# Patient Record
Sex: FEMALE | Race: WHITE | NOT HISPANIC OR LATINO | Employment: UNEMPLOYED | URBAN - METROPOLITAN AREA
[De-identification: names, ages, dates, MRNs, and addresses within clinical notes are randomized per-mention and may not be internally consistent; named-entity substitution may affect disease eponyms.]

---

## 2023-09-12 PROBLEM — K76.0 FATTY LIVER: Status: ACTIVE | Noted: 2023-09-12

## 2023-09-12 PROBLEM — J45.909 ASTHMA (HHS-HCC): Status: ACTIVE | Noted: 2023-09-12

## 2023-09-12 PROBLEM — N95.1 HOT FLASHES DUE TO MENOPAUSE: Status: ACTIVE | Noted: 2023-09-12

## 2023-09-12 PROBLEM — F41.9 ANXIETY: Status: ACTIVE | Noted: 2023-09-12

## 2023-09-12 PROBLEM — Z86.39 HISTORY OF ENDOCRINE DISORDER: Status: ACTIVE | Noted: 2023-09-12

## 2023-09-12 PROBLEM — M26.609 TEMPOROMANDIBULAR JOINT SYNDROME: Status: ACTIVE | Noted: 2023-09-12

## 2023-09-12 PROBLEM — E55.9 VITAMIN D DEFICIENCY: Status: ACTIVE | Noted: 2023-09-12

## 2023-09-12 PROBLEM — E78.00 PURE HYPERCHOLESTEROLEMIA: Status: ACTIVE | Noted: 2023-09-12

## 2023-09-12 PROBLEM — E04.1 THYROID NODULE: Status: ACTIVE | Noted: 2023-09-12

## 2023-09-12 PROBLEM — K91.89 OTHER POSTPROCEDURAL COMPLICATIONS AND DISORDERS OF DIGESTIVE SYSTEM: Status: ACTIVE | Noted: 2023-09-12

## 2023-09-12 PROBLEM — D3A.00 CARCINOID TUMOR (CMS-HCC): Status: ACTIVE | Noted: 2023-09-12

## 2023-09-12 PROBLEM — M70.20 OLECRANON BURSITIS: Status: ACTIVE | Noted: 2023-09-12

## 2023-09-12 RX ORDER — ESTRADIOL 0.03 MG/D
1 FILM, EXTENDED RELEASE TRANSDERMAL
COMMUNITY
End: 2024-01-12 | Stop reason: WASHOUT

## 2023-09-12 RX ORDER — ESTRADIOL 0.04 MG/D
1 FILM, EXTENDED RELEASE TRANSDERMAL 2 TIMES WEEKLY
COMMUNITY
End: 2024-01-12 | Stop reason: WASHOUT

## 2023-09-12 RX ORDER — OMEPRAZOLE 20 MG/1
1 TABLET, DELAYED RELEASE ORAL
COMMUNITY
Start: 2020-06-15 | End: 2024-01-12 | Stop reason: WASHOUT

## 2023-09-12 RX ORDER — PSEUDOEPHEDRINE HCL 30 MG
60 TABLET ORAL EVERY 6 HOURS PRN
COMMUNITY

## 2023-09-12 RX ORDER — FLUTICASONE PROPIONATE AND SALMETEROL 250; 50 UG/1; UG/1
1 POWDER RESPIRATORY (INHALATION)
COMMUNITY
End: 2023-12-27

## 2023-09-12 RX ORDER — BUDESONIDE 180 UG/1
1 AEROSOL, POWDER RESPIRATORY (INHALATION)
COMMUNITY
End: 2024-01-12 | Stop reason: WASHOUT

## 2023-09-12 RX ORDER — CHOLESTYRAMINE 4 G/4.8G
POWDER, FOR SUSPENSION ORAL
COMMUNITY
End: 2024-03-05

## 2023-09-12 RX ORDER — LOPERAMIDE HYDROCHLORIDE 2 MG/1
2 CAPSULE ORAL 4 TIMES DAILY PRN
COMMUNITY
End: 2024-03-12 | Stop reason: WASHOUT

## 2023-12-27 DIAGNOSIS — J45.909 ASTHMA, UNSPECIFIED ASTHMA SEVERITY, UNSPECIFIED WHETHER COMPLICATED, UNSPECIFIED WHETHER PERSISTENT (HHS-HCC): ICD-10-CM

## 2023-12-27 RX ORDER — FLUTICASONE PROPIONATE AND SALMETEROL 50; 250 UG/1; UG/1
POWDER RESPIRATORY (INHALATION)
Qty: 180 EACH | Refills: 2 | Status: SHIPPED | OUTPATIENT
Start: 2023-12-27

## 2023-12-27 RX ORDER — ALBUTEROL SULFATE 90 UG/1
AEROSOL, METERED RESPIRATORY (INHALATION)
Qty: 8 G | Refills: 2 | Status: SHIPPED | OUTPATIENT
Start: 2023-12-27

## 2024-01-12 ENCOUNTER — LAB (OUTPATIENT)
Dept: LAB | Facility: LAB | Age: 59
End: 2024-01-12
Payer: COMMERCIAL

## 2024-01-12 ENCOUNTER — OFFICE VISIT (OUTPATIENT)
Dept: PRIMARY CARE | Facility: CLINIC | Age: 59
End: 2024-01-12
Payer: COMMERCIAL

## 2024-01-12 VITALS
WEIGHT: 191 LBS | DIASTOLIC BLOOD PRESSURE: 84 MMHG | HEART RATE: 69 BPM | TEMPERATURE: 97.2 F | SYSTOLIC BLOOD PRESSURE: 124 MMHG | OXYGEN SATURATION: 95 % | BODY MASS INDEX: 32.61 KG/M2 | HEIGHT: 64 IN

## 2024-01-12 DIAGNOSIS — R35.0 URINE FREQUENCY: Primary | ICD-10-CM

## 2024-01-12 DIAGNOSIS — E78.00 PURE HYPERCHOLESTEROLEMIA: ICD-10-CM

## 2024-01-12 DIAGNOSIS — J45.30 MILD PERSISTENT ASTHMA WITHOUT COMPLICATION (HHS-HCC): ICD-10-CM

## 2024-01-12 DIAGNOSIS — R35.0 URINE FREQUENCY: ICD-10-CM

## 2024-01-12 DIAGNOSIS — Z12.31 VISIT FOR SCREENING MAMMOGRAM: ICD-10-CM

## 2024-01-12 DIAGNOSIS — Z12.11 COLON CANCER SCREENING: ICD-10-CM

## 2024-01-12 PROBLEM — M15.0 PRIMARY OSTEOARTHRITIS INVOLVING MULTIPLE JOINTS: Status: ACTIVE | Noted: 2024-01-12

## 2024-01-12 PROBLEM — Z90.49 STATUS POST PARTIAL COLECTOMY: Status: RESOLVED | Noted: 2018-04-11 | Resolved: 2024-01-12

## 2024-01-12 PROBLEM — M26.609 TEMPOROMANDIBULAR JOINT SYNDROME: Status: RESOLVED | Noted: 2023-09-12 | Resolved: 2024-01-12

## 2024-01-12 PROBLEM — E04.1 RIGHT THYROID NODULE: Status: RESOLVED | Noted: 2018-08-19 | Resolved: 2024-01-12

## 2024-01-12 PROBLEM — M15.9 PRIMARY OSTEOARTHRITIS INVOLVING MULTIPLE JOINTS: Status: ACTIVE | Noted: 2024-01-12

## 2024-01-12 PROBLEM — M12.9 ARTHROPATHY: Status: RESOLVED | Noted: 2024-01-12 | Resolved: 2024-01-12

## 2024-01-12 LAB
APPEARANCE UR: ABNORMAL
BACTERIA #/AREA URNS AUTO: ABNORMAL /HPF
BILIRUB UR STRIP.AUTO-MCNC: NEGATIVE MG/DL
COLOR UR: COLORLESS
GLUCOSE UR STRIP.AUTO-MCNC: NORMAL MG/DL
HYALINE CASTS #/AREA URNS AUTO: ABNORMAL /LPF
KETONES UR STRIP.AUTO-MCNC: NEGATIVE MG/DL
LEUKOCYTE ESTERASE UR QL STRIP.AUTO: ABNORMAL
NITRITE UR QL STRIP.AUTO: NEGATIVE
PH UR STRIP.AUTO: 5.5 [PH]
PROT UR STRIP.AUTO-MCNC: NEGATIVE MG/DL
RBC # UR STRIP.AUTO: ABNORMAL /UL
RBC #/AREA URNS AUTO: ABNORMAL /HPF
SP GR UR STRIP.AUTO: 1.01
SQUAMOUS #/AREA URNS AUTO: ABNORMAL /HPF
UROBILINOGEN UR STRIP.AUTO-MCNC: NORMAL MG/DL
WBC #/AREA URNS AUTO: >50 /HPF

## 2024-01-12 PROCEDURE — 99213 OFFICE O/P EST LOW 20 MIN: CPT | Performed by: INTERNAL MEDICINE

## 2024-01-12 PROCEDURE — 1036F TOBACCO NON-USER: CPT | Performed by: INTERNAL MEDICINE

## 2024-01-12 PROCEDURE — 81001 URINALYSIS AUTO W/SCOPE: CPT

## 2024-01-12 RX ORDER — CEPHALEXIN 500 MG/1
500 CAPSULE ORAL 3 TIMES DAILY
Qty: 21 CAPSULE | Refills: 0 | Status: SHIPPED | OUTPATIENT
Start: 2024-01-12 | End: 2024-01-17

## 2024-01-12 RX ORDER — EPINEPHRINE 0.3 MG/.3ML
INJECTION INTRAMUSCULAR
COMMUNITY
Start: 2023-08-28

## 2024-01-12 ASSESSMENT — ENCOUNTER SYMPTOMS
PALPITATIONS: 0
SHORTNESS OF BREATH: 0

## 2024-01-12 ASSESSMENT — PAIN SCALES - GENERAL: PAINLEVEL: 0-NO PAIN

## 2024-01-12 NOTE — PROGRESS NOTES
[facility]: [facility]  PROGRESS NOTE      Stacey Dyer is a 58 y.o. female that is presenting today for UTI (Burning/urgency).    Assessment/Plan     Diagnoses and all orders for this visit:  Urine frequency  Comments:  Will send for U/A ,culture,cover with Cephalexin 500 mg three times a day for 5 days. Replens good over the counter vagional moisturizer  Orders:  -     cephalexin (Keflex) 500 mg capsule; Take 1 capsule (500 mg) by mouth 3 times a day for 5 days.  Mild persistent asthma without complication  Visit for screening mammogram  Comments:  Over due to see gynecology.  Orders:  -     BI mammo bilateral screening tomosynthesis; Future  Colon cancer screening  Comments:  Over due to see  colonoscopy.    Subjective    1week burning, ,frequency, No blood no back .  Asthma,,over due colonoscopy .      Review of Systems   Respiratory:  Negative for shortness of breath.    Cardiovascular:  Negative for chest pain and palpitations.   All other systems reviewed and are negative.     Objective   Vitals:    01/12/24 1439   BP: 124/84   Pulse: 69   Temp: 36.2 °C (97.2 °F)   SpO2: 95%      Body mass index is 33.3 kg/m².  Physical Exam  Constitutional:       General: She is not in acute distress.  HENT:      Head: Normocephalic and atraumatic.      Right Ear: Tympanic membrane normal.      Left Ear: Tympanic membrane normal.      Mouth/Throat:      Mouth: Mucous membranes are moist.      Pharynx: Oropharynx is clear.   Eyes:      Extraocular Movements: Extraocular movements intact.      Conjunctiva/sclera: Conjunctivae normal.      Pupils: Pupils are equal, round, and reactive to light.   Cardiovascular:      Rate and Rhythm: Normal rate and regular rhythm.   Pulmonary:      Breath sounds: Normal breath sounds.   Abdominal:      General: Bowel sounds are normal.      Palpations: Abdomen is soft. There is no mass.   Musculoskeletal:         General: Normal range of motion.      " Cervical back: Neck supple. No tenderness.   Skin:     General: Skin is warm and dry.   Neurological:      General: No focal deficit present.      Mental Status: She is oriented to person, place, and time.       Diagnostic Results   Lab Results   Component Value Date    GLUCOSE 103 (H) 07/21/2023    CALCIUM 9.8 07/21/2023     07/21/2023    K 4.2 07/21/2023    CO2 26 07/21/2023     07/21/2023    BUN 17 07/21/2023    CREATININE 0.8 07/21/2023     Lab Results   Component Value Date    ALT 78 (H) 07/21/2023    AST 44 (H) 07/21/2023    ALKPHOS 97 07/21/2023    BILITOT 0.5 07/21/2023     Lab Results   Component Value Date    WBC 7.2 07/21/2023    HGB 14.7 07/21/2023    HCT 44.1 (H) 07/21/2023    .9 (H) 07/21/2023     07/21/2023     Lab Results   Component Value Date    CHOL 224 (H) 07/21/2023    CHOL 237 (H) 03/05/2018     Lab Results   Component Value Date     07/21/2023    HDL 75 03/05/2018     Lab Results   Component Value Date    LDLCALC 51 (L) 07/21/2023    LDLCALC 148 (H) 03/05/2018     Lab Results   Component Value Date    TRIG 84 07/21/2023    TRIG 70 03/05/2018     No components found for: \"CHOLHDL\"  Lab Results   Component Value Date    HGBA1C 5.0 06/15/2020     Other labs not included in the list above were reviewed either before or during this encounter.    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   [facility]    DUB (dysfunctional uterine bleeding) 04/26/2011    Fatty liver 09/12/2023    Hep C/B, iron,ferritin,antismooth muscle abs neg. 2/21 ( liver OK on previous CT scn)    Pure hypercholesterolemia 09/12/2023    Right thyroid nodule 08/19/2018    Status post partial colectomy 04/11/2018    Temporomandibular joint syndrome 09/12/2023    Thyroid nodule      8/18 Bx neg. 7/23 [facility] released,TSH nl     Past Surgical History:   Procedure Laterality Date "    APPENDECTOMY      CHOLECYSTECTOMY       Family History   Problem Relation Name Age of Onset    No Known Problems Mother      No Known Problems Father       Social History     Socioeconomic History    Marital status:      Spouse name: Not on file    Number of children: Not on file    Years of education: Not on file    Highest education level: Not on file   Occupational History    Not on file   Tobacco Use    Smoking status: Never    Smokeless tobacco: Never   Vaping Use    Vaping Use: Never used   Substance and Sexual Activity    Alcohol use: Never    Drug use: Never    Sexual activity: Not on file   Other Topics Concern    Not on file   Social History Narrative    Not on file     Social Determinants of Health     Financial Resource Strain: Not on file   Food Insecurity: Not on file   Transportation Needs: Not on file   Physical Activity: Not on file   Stress: Not on file   Social Connections: Not on file   Intimate Partner Violence: Not on file   Housing Stability: Not on file     Allergies   Allergen Reactions    Azithromycin Nausea/vomiting     Current Outpatient Medications on File Prior to Visit   Medication Sig Dispense Refill    Advair Diskus 250-50 mcg/dose diskus inhaler INHALE 1 PUFF BY MOUTH TWICE A DAY FOR 90 DAYS *RINSE MOUTH AFTER USE* 180 each 2    albuterol 90 mcg/actuation inhaler INHALE 2 PUFFS BY MOUTH EVERY 4 HOURS AS NEEDED FOR 90 DAYS 8 g 2    ascorbic acid (VITAMIN C ORAL) Take by mouth if needed.      cholecalciferol, vitamin D3, (VITAMIN D3 ORAL) if needed.      cholestyramine light (Prevalite) 4 gram packet Take by mouth. USE 1 PACKET 1 TO 2 TIMES      EpiPen 2-Thomas 0.3 mg/0.3 mL injection syringe as directed Injection for anaphylactic shock for 30 days      loperamide (Imodium) 2 mg capsule Take 1 capsule (2 mg) by mouth 4 times a day as needed.      pseudoephedrine (Sudafed) 30 mg tablet Take 2 tablets (60 mg) by mouth every 6 hours if needed.      VITAMIN B COMPLEX ORAL Take by  mouth if needed.      ZINC ORAL if needed.      [DISCONTINUED] albuterol 108 (90 Base) MCG/ACT inhaler Inhale 1 puff every 4 hours.      [DISCONTINUED] albuterol 108 (90 Base) MCG/ACT inhaler Inhale 2 puffs every 4 hours if needed.      [DISCONTINUED] ascorbic acid/collagen hydr (ASCORBIC ACID-COLLAGEN ORAL) Take by mouth.      [DISCONTINUED] B.animalis,bifid,infantis,long (PROBIOTIC 4X ORAL) Take by mouth.      [DISCONTINUED] budesonide (Pulmicort Flexhaler) 180 mcg/actuation inhaler Inhale 1 puff 2 times a day.      [DISCONTINUED] estradiol (Vivelle-DOT) 0.025 mg/24 hr patch Place 1 patch on the skin 1 (one) time per week.      [DISCONTINUED] estradiol (Vivelle-DOT) 0.0375 mg/24 hr Place 1 patch on the skin 2 times a week.      [DISCONTINUED] omeprazole OTC (PriLOSEC OTC) 20 mg EC tablet Take 1 Application by mouth once daily in the morning. Take before meals.       No current facility-administered medications on file prior to visit.     Immunization History   Administered Date(s) Administered    Influenza, Unspecified 02/12/2014    Influenza, injectable, quadrivalent 10/19/2016    Influenza, seasonal, injectable 11/01/2004, 01/01/2008, 09/21/2011    Novel Influenza-H1N1-09, all formulations 12/01/2009    Novel influenza-H1N1-09, preservative-free 12/17/2009    Pneumococcal polysaccharide vaccine, 23-valent, age 2 years and older (PNEUMOVAX 23) 01/01/2008, 12/01/2021    Tdap vaccine, age 7 year and older (BOOSTRIX) 09/21/2011, 12/01/2021     Patient's medical history was reviewed and updated either before or during this encounter.       Rd Muhammad MD

## 2024-01-12 NOTE — PATIENT INSTRUCTIONS
Diagnoses and all orders for this visit:  Urine frequency  Comments:  Will send for U/A ,culture,cover with Cephalexin 500 mg three times a day for 5 days. Replens good over the counter vagional moisturizer  Orders:  -     cephalexin (Keflex) 500 mg capsule; Take 1 capsule (500 mg) by mouth 3 times a day for 5 days.  Mild persistent asthma without complication  Visit for screening mammogram  Comments:  Over due to see gynecology.  Orders:  -     BI mammo bilateral screening tomosynthesis; Future  Colon cancer screening  Comments:  Over due to see  colonoscopy.

## 2024-03-04 DIAGNOSIS — D3A.00 BENIGN CARCINOID TUMOR OF UNSPECIFIED SITE (CMS-HCC): ICD-10-CM

## 2024-03-05 RX ORDER — CHOLESTYRAMINE 4 G/4.8G
POWDER, FOR SUSPENSION ORAL
Qty: 60 PACKET | Refills: 4 | Status: SHIPPED | OUTPATIENT
Start: 2024-03-05

## 2024-03-12 ENCOUNTER — OFFICE VISIT (OUTPATIENT)
Dept: PRIMARY CARE | Facility: CLINIC | Age: 59
End: 2024-03-12
Payer: COMMERCIAL

## 2024-03-12 VITALS
WEIGHT: 180 LBS | HEART RATE: 65 BPM | HEIGHT: 63 IN | OXYGEN SATURATION: 95 % | BODY MASS INDEX: 31.89 KG/M2 | TEMPERATURE: 96.3 F | DIASTOLIC BLOOD PRESSURE: 70 MMHG | SYSTOLIC BLOOD PRESSURE: 120 MMHG

## 2024-03-12 DIAGNOSIS — Z01.818 PRE-OPERATIVE EXAMINATION FOR INTERNAL MEDICINE: Primary | ICD-10-CM

## 2024-03-12 PROCEDURE — 99213 OFFICE O/P EST LOW 20 MIN: CPT | Performed by: LICENSED PRACTICAL NURSE

## 2024-03-12 PROCEDURE — 1036F TOBACCO NON-USER: CPT | Performed by: LICENSED PRACTICAL NURSE

## 2024-03-12 ASSESSMENT — ENCOUNTER SYMPTOMS
OCCASIONAL FEELINGS OF UNSTEADINESS: 1
LOSS OF SENSATION IN FEET: 0
DEPRESSION: 0
ARTHRALGIAS: 1

## 2024-03-12 ASSESSMENT — PAIN SCALES - GENERAL: PAINLEVEL: 6

## 2024-03-12 ASSESSMENT — PATIENT HEALTH QUESTIONNAIRE - PHQ9
1. LITTLE INTEREST OR PLEASURE IN DOING THINGS: NOT AT ALL
SUM OF ALL RESPONSES TO PHQ9 QUESTIONS 1 AND 2: 0
2. FEELING DOWN, DEPRESSED OR HOPELESS: NOT AT ALL

## 2024-03-12 NOTE — PROGRESS NOTES
[facility]: [facility]  PROGRESS NOTE      Stacey Dyer is a 59 y.o. female that is presenting today for Follow-up.      Subjective   Pt presents to the office today for preoperative medical clearance. She is scheduled for a left total knee replacement on 4/9/2024 with Dr. Rudy Mack. She is also scheduled for preoperative clearance at [facility] on 3/26/24. She reports her chronic medical conditions are stable and she is without new health complaints. She is able to walk up a flight of stairs without shortness of breath or perform household chores without difficulty.       Review of Systems   Musculoskeletal:  Positive for arthralgias.      Objective   Vitals:    03/12/24 0837   BP: 120/70   Pulse: 65   Temp: 35.7 °C (96.3 °F)   SpO2: 95%      Body mass index is 31.89 kg/m².  Physical Exam  Constitutional:       General: She is not in acute distress.     Appearance: She is not ill-appearing, toxic-appearing or diaphoretic.   Neck:      Vascular: No carotid bruit.   Cardiovascular:      Rate and Rhythm: Normal rate and regular rhythm.      Heart sounds: Normal heart sounds.   Pulmonary:      Effort: Pulmonary effort is normal. No respiratory distress.      Breath sounds: Normal breath sounds. No wheezing, rhonchi or rales.   Abdominal:      General: Bowel sounds are normal. There is no distension.      Palpations: Abdomen is soft. There is no mass.      Tenderness: There is no abdominal tenderness. There is no guarding.      Hernia: No hernia is present.   Skin:     General: Skin is warm and dry.       Diagnostic Results   Lab Results   Component Value Date    GLUCOSE 103 (H) 07/21/2023    CALCIUM 9.8 07/21/2023     07/21/2023    K 4.2 07/21/2023    CO2 26 07/21/2023     07/21/2023    BUN 17 07/21/2023    CREATININE 0.8 07/21/2023     Lab Results   Component Value Date    ALT 78 (H) 07/21/2023    AST 44 (H) 07/21/2023    ALKPHOS 97 07/21/2023    BILITOT 0.5 07/21/2023     Lab  "Results   Component Value Date    WBC 7.2 07/21/2023    HGB 14.7 07/21/2023    HCT 44.1 (H) 07/21/2023    .9 (H) 07/21/2023     07/21/2023     Lab Results   Component Value Date    CHOL 224 (H) 07/21/2023    CHOL 237 (H) 03/05/2018     Lab Results   Component Value Date     07/21/2023    HDL 75 03/05/2018     Lab Results   Component Value Date    LDLCALC 51 (L) 07/21/2023    LDLCALC 148 (H) 03/05/2018     Lab Results   Component Value Date    TRIG 84 07/21/2023    TRIG 70 03/05/2018     No components found for: \"CHOLHDL\"  Lab Results   Component Value Date    HGBA1C 5.0 06/15/2020     Other labs not included in the list above were reviewed either before or during this encounter.    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   [facility]    DUB (dysfunctional uterine bleeding) 04/26/2011    Fatty liver 09/12/2023    Hep C/B, iron,ferritin,antismooth muscle abs neg. 2/21 ( liver OK on previous CT scn)    Primary osteoarthritis involving multiple joints 01/12/2024    B knee arthritis 7/23 , Frames rec.    Pure hypercholesterolemia 09/12/2023    Right thyroid nodule 08/19/2018    Status post partial colectomy 04/11/2018    Temporomandibular joint syndrome 09/12/2023    Thyroid nodule      8/18 Bx neg. 7/23 [facility] released,TSH nl     Past Surgical History:   Procedure Laterality Date    APPENDECTOMY      CHOLECYSTECTOMY       Family History   Problem Relation Name Age of Onset    No Known Problems Mother      No Known Problems Father       Social History     Socioeconomic History    Marital status:      Spouse name: Not on file    Number of children: Not on file    Years of education: Not on file    Highest education level: Not on file   Occupational History    Not on file   Tobacco Use    Smoking status: Never    Smokeless tobacco: Never   Vaping Use    Vaping Use: " Never used   Substance and Sexual Activity    Alcohol use: Never    Drug use: Never    Sexual activity: Not on file   Other Topics Concern    Not on file   Social History Narrative    Not on file     Social Determinants of Health     Financial Resource Strain: Not on file   Food Insecurity: Not on file   Transportation Needs: Not on file   Physical Activity: Not on file   Stress: Not on file   Social Connections: Not on file   Intimate Partner Violence: Not on file   Housing Stability: Not on file     Allergies   Allergen Reactions    Azithromycin Nausea/vomiting     Current Outpatient Medications on File Prior to Visit   Medication Sig Dispense Refill    Advair Diskus 250-50 mcg/dose diskus inhaler INHALE 1 PUFF BY MOUTH TWICE A DAY FOR 90 DAYS *RINSE MOUTH AFTER USE* 180 each 2    albuterol 90 mcg/actuation inhaler INHALE 2 PUFFS BY MOUTH EVERY 4 HOURS AS NEEDED FOR 90 DAYS 8 g 2    cholestyramine light (Prevalite) 4 gram packet USE 1 PACKET 1 TO 2 TIMES A DAY ORALLY AS DIRECTED FOR 90 DAYS TOTAL 30 60 packet 4    EpiPen 2-Thomas 0.3 mg/0.3 mL injection syringe as directed Injection for anaphylactic shock for 30 days      pseudoephedrine (Sudafed) 30 mg tablet Take 2 tablets (60 mg) by mouth every 6 hours if needed.      [DISCONTINUED] ascorbic acid (VITAMIN C ORAL) Take by mouth if needed.      [DISCONTINUED] cholecalciferol, vitamin D3, (VITAMIN D3 ORAL) if needed.      [DISCONTINUED] loperamide (Imodium) 2 mg capsule Take 1 capsule (2 mg) by mouth 4 times a day as needed.      [DISCONTINUED] VITAMIN B COMPLEX ORAL Take by mouth if needed.      [DISCONTINUED] ZINC ORAL if needed.       No current facility-administered medications on file prior to visit.     Immunization History   Administered Date(s) Administered    Influenza, Unspecified 02/12/2014    Influenza, injectable, quadrivalent 10/19/2016    Influenza, seasonal, injectable 11/01/2004, 01/01/2008, 09/21/2011    Novel Influenza-H1N1-09, all formulations  12/01/2009    Novel influenza-H1N1-09, preservative-free 12/17/2009    Pneumococcal polysaccharide vaccine, 23-valent, age 2 years and older (PNEUMOVAX 23) 01/01/2008, 12/01/2021    Tdap vaccine, age 7 year and older (BOOSTRIX, ADACEL) 09/21/2011, 12/01/2021     Patient's medical history was reviewed and updated either before or during this encounter.       Assessment/Plan   Problem List Items Addressed This Visit    None  Visit Diagnoses       Pre-operative examination for internal medicine    -  Primary        Functional capacity >4 METS    Mrs. Dyer's chronic medical conditions are stable. She is without complaints. She is medically optimized and is cleared for surgery.     Riley Nam, FARIBA-CNP

## 2024-03-26 ENCOUNTER — PRE-ADMISSION TESTING (OUTPATIENT)
Dept: PREADMISSION TESTING | Facility: HOSPITAL | Age: 59
End: 2024-03-26
Payer: COMMERCIAL

## 2024-03-26 VITALS
TEMPERATURE: 97.5 F | BODY MASS INDEX: 32.07 KG/M2 | WEIGHT: 181 LBS | DIASTOLIC BLOOD PRESSURE: 90 MMHG | HEART RATE: 85 BPM | RESPIRATION RATE: 16 BRPM | SYSTOLIC BLOOD PRESSURE: 150 MMHG | OXYGEN SATURATION: 95 % | HEIGHT: 63 IN

## 2024-03-26 DIAGNOSIS — Z01.818 PREOPERATIVE TESTING: Primary | ICD-10-CM

## 2024-03-26 LAB
ALBUMIN SERPL-MCNC: 4.2 G/DL (ref 3.5–5)
ALP BLD-CCNC: 78 U/L (ref 35–125)
ALT SERPL-CCNC: 100 U/L (ref 5–40)
ANION GAP SERPL CALC-SCNC: 12 MMOL/L
APPEARANCE UR: CLEAR
AST SERPL-CCNC: 43 U/L (ref 5–40)
BILIRUB SERPL-MCNC: 0.5 MG/DL (ref 0.1–1.2)
BILIRUB UR STRIP.AUTO-MCNC: NEGATIVE MG/DL
BUN SERPL-MCNC: 16 MG/DL (ref 8–25)
CALCIUM SERPL-MCNC: 9.5 MG/DL (ref 8.5–10.4)
CHLORIDE SERPL-SCNC: 102 MMOL/L (ref 97–107)
CO2 SERPL-SCNC: 24 MMOL/L (ref 24–31)
COLOR UR: NORMAL
CREAT SERPL-MCNC: 0.7 MG/DL (ref 0.4–1.6)
EGFRCR SERPLBLD CKD-EPI 2021: >90 ML/MIN/1.73M*2
ERYTHROCYTE [DISTWIDTH] IN BLOOD BY AUTOMATED COUNT: 12.6 % (ref 11.5–14.5)
GLUCOSE SERPL-MCNC: 103 MG/DL (ref 65–99)
GLUCOSE UR STRIP.AUTO-MCNC: NORMAL MG/DL
HCT VFR BLD AUTO: 43 % (ref 36–46)
HGB BLD-MCNC: 14.7 G/DL (ref 12–16)
KETONES UR STRIP.AUTO-MCNC: NEGATIVE MG/DL
LEUKOCYTE ESTERASE UR QL STRIP.AUTO: NEGATIVE
MCH RBC QN AUTO: 33 PG (ref 26–34)
MCHC RBC AUTO-ENTMCNC: 34.2 G/DL (ref 32–36)
MCV RBC AUTO: 96 FL (ref 80–100)
NITRITE UR QL STRIP.AUTO: NEGATIVE
NRBC BLD-RTO: 0 /100 WBCS (ref 0–0)
PH UR STRIP.AUTO: 5 [PH]
PLATELET # BLD AUTO: 262 X10*3/UL (ref 150–450)
POTASSIUM SERPL-SCNC: 4.4 MMOL/L (ref 3.4–5.1)
PROT SERPL-MCNC: 8.2 G/DL (ref 5.9–7.9)
PROT UR STRIP.AUTO-MCNC: NEGATIVE MG/DL
RBC # BLD AUTO: 4.46 X10*6/UL (ref 4–5.2)
RBC # UR STRIP.AUTO: NEGATIVE /UL
SODIUM SERPL-SCNC: 138 MMOL/L (ref 133–145)
SP GR UR STRIP.AUTO: 1.01
UROBILINOGEN UR STRIP.AUTO-MCNC: NORMAL MG/DL
WBC # BLD AUTO: 5.6 X10*3/UL (ref 4.4–11.3)

## 2024-03-26 PROCEDURE — 36415 COLL VENOUS BLD VENIPUNCTURE: CPT

## 2024-03-26 PROCEDURE — 99214 OFFICE O/P EST MOD 30 MIN: CPT | Performed by: NURSE PRACTITIONER

## 2024-03-26 PROCEDURE — 80053 COMPREHEN METABOLIC PANEL: CPT

## 2024-03-26 PROCEDURE — 87081 CULTURE SCREEN ONLY: CPT | Mod: TRILAB,WESLAB

## 2024-03-26 PROCEDURE — 81003 URINALYSIS AUTO W/O SCOPE: CPT

## 2024-03-26 PROCEDURE — 85027 COMPLETE CBC AUTOMATED: CPT

## 2024-03-26 RX ORDER — CHLORHEXIDINE GLUCONATE ORAL RINSE 1.2 MG/ML
SOLUTION DENTAL
Qty: 473 ML | Refills: 0 | Status: SHIPPED | OUTPATIENT
Start: 2024-03-26 | End: 2024-04-10 | Stop reason: HOSPADM

## 2024-03-26 RX ORDER — IBUPROFEN 200 MG
400 TABLET ORAL EVERY 6 HOURS PRN
COMMUNITY

## 2024-03-26 RX ORDER — ACETAMINOPHEN 325 MG/1
325 TABLET ORAL EVERY 6 HOURS PRN
COMMUNITY

## 2024-03-26 ASSESSMENT — DUKE ACTIVITY SCORE INDEX (DASI)
CAN YOU DO HEAVY WORK AROUND THE HOUSE LIKE SCRUBBING FLOORS OR LIFTING AND MOVING HEAVY FURNITURE: YES
CAN YOU WALK INDOORS, SUCH AS AROUND YOUR HOUSE: YES
CAN YOU CLIMB A FLIGHT OF STAIRS OR WALK UP A HILL: YES
CAN YOU PARTICIPATE IN MODERATE RECREATIONAL ACTIVITIES LIKE GOLF, BOWLING, DANCING, DOUBLES TENNIS OR THROWING A BASEBALL OR FOOTBALL: YES
CAN YOU DO MODERATE WORK AROUND THE HOUSE LIKE VACUUMING, SWEEPING FLOORS OR CARRYING GROCERIES: YES
CAN YOU RUN A SHORT DISTANCE: NO
DASI METS SCORE: 7.1
CAN YOU DO YARD WORK LIKE RAKING LEAVES, WEEDING OR PUSHING A MOWER: NO
CAN YOU PARTICIPATE IN STRENOUS SPORTS LIKE SWIMMING, SINGLES TENNIS, FOOTBALL, BASKETBALL, OR SKIING: NO
CAN YOU HAVE SEXUAL RELATIONS: YES
CAN YOU WALK A BLOCK OR TWO ON LEVEL GROUND: NO
CAN YOU TAKE CARE OF YOURSELF (EAT, DRESS, BATHE, OR USE TOILET): YES
TOTAL_SCORE: 35.45
CAN YOU DO LIGHT WORK AROUND THE HOUSE LIKE DUSTING OR WASHING DISHES: YES

## 2024-03-26 ASSESSMENT — ENCOUNTER SYMPTOMS
RESPIRATORY NEGATIVE: 1
CARDIOVASCULAR NEGATIVE: 1
CONSTIPATION: 1
PSYCHIATRIC NEGATIVE: 1
ACTIVITY CHANGE: 1
EYES NEGATIVE: 1
ARTHRALGIAS: 1

## 2024-03-26 ASSESSMENT — CHADS2 SCORE
DIABETES: NO
AGE GREATER THAN OR EQUAL TO 75: NO
HYPERTENSION: NO
PRIOR STROKE OR TIA OR THROMBOEMBOLISM: NO
CHF: NO
CHADS2 SCORE: 0

## 2024-03-26 ASSESSMENT — PAIN DESCRIPTION - DESCRIPTORS: DESCRIPTORS: DULL

## 2024-03-26 ASSESSMENT — PAIN - FUNCTIONAL ASSESSMENT: PAIN_FUNCTIONAL_ASSESSMENT: 0-10

## 2024-03-26 ASSESSMENT — PAIN SCALES - GENERAL: PAINLEVEL_OUTOF10: 7

## 2024-03-26 NOTE — PREPROCEDURE INSTRUCTIONS
Preoperative Fasting Guidelines    Why must I stop eating and drinking near surgery time?  With sedation, food or liquid in your stomach can enter your lungs causing serious complications  Increases nausea and vomiting    When do I need to stop eating and drinking before my surgery?  Do not eat any food after midnight the night before your surgery/procedure.  You may have up to TEN ounces of clear liquid until TWO hours before your instructed arrival time to the hospital.  This includes water, black tea/coffee, (no milk or cream) apple juice, and electrolyte drinks (Gatorade)  You may chew gum until TWO hours before your surgery/procedure    PAT DISCHARGE INSTRUCTIONS    Please call the Same Day Surgery (SDS) Department of the hospital where your procedure will be performed after 2:00 PM the day before your surgery. If you are scheduled on a Monday, or a Tuesday following a Monday holiday, you will need to call on the last business day prior to your surgery.    [place] [id number] [phone]    Please let your surgeon know if:      You develop any open sores, shingles, burning or painful urination as these may increase your risk of an infection.   You no longer wish to have the surgery.   Any other personal circumstances change that may lead to the need to cancel or defer this surgery-such as being sick or getting admitted to any hospital within one week of your planned procedure.    Your contact details change, such as a change of address or phone number.    Starting now:     Please DO NOT drink alcohol or smoke for 24 hours before surgery. It is well known that quitting smoking can make a huge difference to your health and recovery from surgery. The longer you abstain from smoking, the better your chances of a healthy recovery. If you need help with quitting, call 8-800-QUIT-NOW to be connected to a trained counselor who will discuss the best  methods to help you quit.     Before your surgery:    Please stop all supplements 7 days prior to surgery. Or as directed by your surgeon.   Please stop taking NSAID pain medicine such as Advil and Motrin 7 days before surgery.    If you develop any fever, cough, cold, rashes, cuts, scratches, scrapes, urinary symptoms or infection anywhere on your body (including teeth and gums) prior to surgery, please call your surgeon’s office as soon as possible. This may require treatment to reduce the chance of cancellation on the day of surgery.  PLEASE REVIEW ERAS PACKET PROVIDED IN PAT.    The day before your surgery:   DIET- Please follow the diet instructions at the top of your packet.   Get a good night’s rest.  Use the special soap for bathing if you have been instructed to use one.    Scheduled surgery times may change and you will be notified if this occurs - please check your personal voicemail for any updates.     On the morning of surgery:   Wear comfortable, loose fitting clothes which open in the front. Please do not wear moisturizers, creams, lotions, makeup or perfume.    Please bring with you to surgery:   Photo ID and insurance card   Current list of medicines and allergies   Pacemaker/ Defibrillator/Heart stent cards   CPAP machine and mask    Slings/ splints/ crutches   A copy of your complete advanced directive/DHPOA.    Please do NOT bring with you to surgery:   All jewelry and valuables should be left at home.   Prosthetic devices such as contact lenses, hearing aids, dentures, eyelash extensions, hairpins and body piercings must be removed prior to going in to the surgical suite.    After outpatient surgery:   A responsible adult MUST accompany you at the time of discharge and stay with you for 24 hours after your surgery. You may NOT drive yourself home after surgery.    Do not drive, operate machinery, make critical decisions or do activities that require co-ordination or balance until after a  night’s sleep.   Do not drink alcoholic beverages for 24 hours.   Instructions for resuming your medications will be provided by your surgeon.    CALL YOUR DOCTOR AFTER SURGERY IF YOU HAVE:     Chills and/or a fever of 101° F or higher.    Redness, swelling, pus or drainage from your surgical wound or a bad smell from the wound.    Lightheadedness, fainting or confusion.    Persistent vomiting (throwing up) and are not able to eat or drink for 12 hours.    Three or more loose, watery bowel movements in 24 hours (diarrhea).   Difficulty or pain while urinating( after non-urological surgery)    Pain and swelling in your legs, especially if it is only on one side.    Difficulty breathing or are breathing faster than normal.    Any new concerning symptoms.        Patient Information: Pre-Operative Infection Prevention Measures     Why did I have my nose, under my arms, and groin swabbed?  The purpose of the swab is to identify Staphylococcus aureus inside your nose or on your skin.  The swab was sent to the laboratory for culture.  A positive swab/culture for Staphylococcus aureus is called colonization or carriage.      What is Staphylococcus aureus?  Staphylococcus aureus, also known as “staph”, is a germ found on the skin or in the nose of healthy people.  Sometimes Staphylococcus aureus can get into the body and cause an infection.  This can be minor (such as pimples, boils, or other skin problems).  It might also be serious (such as a blood infection, pneumonia, or a surgical site infection).    What is Staphylococcus aureus colonization or carriage?  Colonization or carriage means that a person has the germ but is not sick from it.  These bacteria can be spread on the hands or when breathing or sneezing.    How is Staphylococcus aureus spread?  It is most often spread by close contact with a person or item that carries it.    What happens if my culture is positive for Staphylococcus aureus?  Your doctor/medical  team will use this information to guide any antibiotic treatment which may be necessary.  Regardless of the culture results, we will clean the inside of your nose with a betadine swab just before you have your surgery.      Will I get an infection if I have Staphylococcus aureus in my nose or on my skin?  Anyone can get an infection with Staphylococcus aureus.  However, the best way to reduce your risk of infection is to follow the instructions provided to you for the use of your CHG soap and dental rinse.        Patient Information: Oral/Dental Rinse    What is oral/dental rinse?   It is a mouthwash. It is a way of cleaning the mouth with a germ-killing solution before your surgery.  The solution contains chlorhexidine, commonly known as CHG.   It is used inside the mouth to kill a bacteria known as Staphylococcus aureus.  Let your doctor know if you are allergic to Chlorhexidine.    Why do I need to use CHG oral/dental rinse?  The CHG oral/dental rinse helps to kill a bacteria in your mouth known as Staphylococcus aureus.     This reduces the risk of infection at the surgical site.      Using your CHG oral/dental rinse  STEPS:  Use your CHG oral/dental rinse after you brush your teeth the night before (at bedtime) and the morning of your surgery.  Follow all directions on your prescription label.    Use the cap on the container to measure 15ml   Swish (gargle if you can) the mouthwash in your mouth for at least 30 seconds, (do not swallow) and spit out  After you use your CHG rinse, do not rinse your mouth with water, drink or eat.  Please refer to the prescription label for the appropriate time to resume oral intake      What side effects might I have using the CHG oral/dental rinse?  CHG rinse will stick to plaque on the teeth.  Brush and floss just before use.  Teeth brushing will help avoid staining of plaque during use.      Patient Information: Home Preoperative Antibacterial Shower      What is a home  preoperative antibacterial shower?  This shower is a way of cleaning the skin with a germ-killing solution before surgery.  The solution contains chlorhexidine, commonly known as CHG.  CHG is a skin cleanser with germ-killing ability.  Let your doctor know if you are allergic to chlorhexidine.    Why do I need to take a preoperative antibacterial shower?  Skin is not sterile.  It is best to try to make your skin as free of germs as possible before surgery.  Proper cleansing with a germ-killing soap before surgery can lower the number of germs on your skin.  This helps to reduce the risk of infection at the surgical site.  Following the instructions listed below will help you prepare your skin for surgery.      How do I use the solution?  Steps:  Begin using your CHG soap 5 days before your scheduled surgery on ________________________.    First, wash and rinse your hair using the CHG soap. Keep CHG soap away from ear canals and eyes.  Rinse completely, do not condition.  Hair extensions should be removed.  Wash your face with your normal soap and rinse.    Apply the CHG solution to a clean wet washcloth.  Turn the water off or move away from the water spray to avoid premature rinsing of the CHG soap as you are applying.   Firmly lather your entire body from the neck down.  Do not use on your face.  Pay special attention to the area(s) where your incision(s) will be located unless they are on your face.  Avoid scrubbing your skin too hard.  The important point is to have the CHG soap sit on your skin for 3 minutes.    When the 3 minutes are up, turn on the water and rinse the CHG solution off your body completely.   DO NOT wash with regular soap after you have used the CHG soap solution  Pat yourself dry with a clean, freshly-laundered towel.  DO NOT apply powders, deodorants, or lotions.  Dress in clean, freshly laundered nightclothes.    Be sure to sleep with clean, freshly laundered sheets.  Be aware that CHG will  cause stains on fabrics; if you wash them with bleach after use.  Rinse your washcloth and other linens that have contact with CHG completely.  Use only non-chlorine detergents to launder the items used.   The morning of surgery is the fifth day.  Repeat the above steps and dress in clean comfortable clothing     Whom should I contact if I have any questions regarding the use of CHG soap?  Call the [facility] at [phone] if you have any questions.                 Preoperative Brain Exercises    What are brain exercises?  A brain exercise is any activity that engages your thinking (cognitive) skills.    What types of activities are considered brain exercises?  Jigsaw puzzles, crossword puzzles, word jumble, memory games, word search, and many more.  Many can be found free online or on your phone via a mobile emilia.    Why should I do brain exercises before my surgery?  More recent research has shown brain exercise before surgery can lower the risk of postoperative delirium (confusion) which can be especially important for older adults.  Patients who did brain exercises for 5 to 10 hours the days before surgery, cut their risk of postoperative delirium in half up to 1 week after surgery.     Medication List            Accurate as of March 26, 2024 12:17 PM. Always use your most recent med list.                acetaminophen 325 mg tablet  Commonly known as: Tylenol  Notes to patient: Take as needed.     Advair Diskus 250-50 mcg/dose diskus inhaler  Generic drug: fluticasone propion-salmeteroL  INHALE 1 PUFF BY MOUTH TWICE A DAY FOR 90 DAYS *RINSE MOUTH AFTER USE*  Medication Adjustments for Surgery: Take morning of surgery with sip of water, no other fluids  Notes to patient: BRING TO HOSPITAL     albuterol 90 mcg/actuation inhaler  INHALE 2 PUFFS BY MOUTH EVERY 4 HOURS AS NEEDED FOR 90 DAYS  Notes to patient: Take as needed. BRING TO HOSPITAL      chlorhexidine 0.12 % solution  Commonly known as: Peridex  Use cap to measure 15 mL.  Swish/gargle mouthwash for at least 30 seconds.  Do not swallow.  Use night before surgery after brushing teeth and morning of surgery after brushing teeth.  Notes to patient: PER INSTRUCTIONS     cholestyramine light 4 gram packet  Commonly known as: Prevalite  USE 1 PACKET 1 TO 2 TIMES A DAY ORALLY AS DIRECTED FOR 90 DAYS TOTAL 30  Notes to patient: Hold 24 HOURS BEFORE SURGERY     EpiPen 2-Thomas 0.3 mg/0.3 mL injection syringe  Generic drug: EPINEPHrine  Notes to patient: Take as needed.     ibuprofen 200 mg tablet  Medication Adjustments for Surgery: Stop 7 days before surgery     Sudafed 30 mg tablet  Generic drug: pseudoephedrine  Notes to patient: Take as needed.

## 2024-03-26 NOTE — H&P (VIEW-ONLY)
"CPM/PAT Evaluation       Name: Stacey Dyer (Stacey Dyer)  /Age: 1965/59 y.o.     In-Person       Chief Complaint: LEFT KNEE OSTEOARTHRITIS     HPI  A 59-year-old female with left knee osteoarthritis.  History of progressive bilateral knee pain L>R over the past 15 years that has become more severe in the last 2 years. Patient states \"My knee gets stuck\".  Pain radiates down LLE and symptoms increase with prolonged standing, ambulation, stairs, and transitioning from sitting to standing interfering with sleep, ADLs and quality. Conservative treatments /injections not helping.  Denies fever, chills, chest pain, shortness of breath, syncope, or right lower extremity numbness.  She is scheduled for open left total knee arthroplasty.    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   [facility]    DUB (dysfunctional uterine bleeding) 2011    Fatty liver 2023    Hep C/B, iron,ferritin,antismooth muscle abs neg.  ( liver OK on previous CT scn)    Primary osteoarthritis involving multiple joints 2024    B knee arthritis  , Frames rec.    Pure hypercholesterolemia 2023    Right thyroid nodule 2018    Status post partial colectomy 2018    Temporomandibular joint syndrome 2023    Thyroid nodule       Bx neg.  [facility] released,TSH nl       Past Surgical History:   Procedure Laterality Date    APPENDECTOMY      CHOLECYSTECTOMY           Allergies   Allergen Reactions    Bee Venom Protein (Honey Bee) Swelling     FACIALSWELLING THROAT SWELLING    Azithromycin Nausea/vomiting       Current Outpatient Medications   Medication Sig Dispense Refill    acetaminophen (Tylenol) 325 mg tablet Take 1 tablet (325 mg) by mouth every 6 hours if needed for mild pain (1 - 3).      Advair Diskus 250-50 mcg/dose diskus inhaler INHALE 1 PUFF BY MOUTH TWICE A DAY " FOR 90 DAYS *RINSE MOUTH AFTER USE* 180 each 2    albuterol 90 mcg/actuation inhaler INHALE 2 PUFFS BY MOUTH EVERY 4 HOURS AS NEEDED FOR 90 DAYS 8 g 2    chlorhexidine (Peridex) 0.12 % solution Use cap to measure 15 mL.  Swish/gargle mouthwash for at least 30 seconds.  Do not swallow.  Use night before surgery after brushing teeth and morning of surgery after brushing teeth. 473 mL 0    cholestyramine light (Prevalite) 4 gram packet USE 1 PACKET 1 TO 2 TIMES A DAY ORALLY AS DIRECTED FOR 90 DAYS TOTAL 30 60 packet 4    EpiPen 2-Thomas 0.3 mg/0.3 mL injection syringe as directed Injection for anaphylactic shock for 30 days      ibuprofen 200 mg tablet Take 2 tablets (400 mg) by mouth every 6 hours if needed for mild pain (1 - 3).      pseudoephedrine (Sudafed) 30 mg tablet Take 2 tablets (60 mg) by mouth every 6 hours if needed.       No current facility-administered medications for this visit.     Review of Systems   Constitutional:  Positive for activity change.   HENT: Negative.     Eyes: Negative.    Respiratory: Negative.     Cardiovascular: Negative.    Gastrointestinal:  Positive for constipation (chronic).   Genitourinary: Negative.    Musculoskeletal:  Positive for arthralgias and gait problem.        Bilateral knee pain   Skin: Negative.    Psychiatric/Behavioral: Negative.          Physical Exam  Vitals (Elevated BP-patient advised to monitor BP) reviewed.   HENT:      Head: Normocephalic and atraumatic.      Mouth/Throat:      Mouth: Mucous membranes are moist.   Eyes:      Pupils: Pupils are equal, round, and reactive to light.   Cardiovascular:      Rate and Rhythm: Normal rate and regular rhythm.   Pulmonary:      Effort: Pulmonary effort is normal.      Breath sounds: Normal breath sounds.   Abdominal:      Palpations: Abdomen is soft.   Musculoskeletal:      Cervical back: Normal range of motion.      Comments: Bilateral knee pain, antalgic gait   Skin:     General: Skin is warm and dry.   Neurological:  "     Mental Status: She is alert and oriented to person, place, and time.   Psychiatric:         Mood and Affect: Mood normal.          PAT AIRWAY:   Airway:     Mallampati::  II    Neck ROM::  Full  normal        /90   Pulse 85   Temp 36.4 °C (97.5 °F) (Temporal)   Resp 16   Ht 1.6 m (5' 3\")   Wt 82.1 kg (181 lb)   SpO2 95%   BMI 32.06 kg/m²     Stop Bang Score 2     CHADS 2 score: 1.9%  DASI score: 35.45  METS score: 7.1  Revised cardiac risk index: 0.9%  ASA II  ARISCAT 1.6%  Clearance done 3/12/2024 with PCP  PAT orders CBC, CMP, MRSA, UA  Assessment and Plan:     LEFT KNEE OSTEOARTHRITIS Plan: Open left total knee arthroplasty  Asthma well controlled  H/O carcinoid tumor s/p appendectomy, intestinal resection 2018  Chronic constipation  BMI-31.89        "

## 2024-03-28 LAB — STAPHYLOCOCCUS SPEC CULT: ABNORMAL

## 2024-04-09 ENCOUNTER — ANESTHESIA (OUTPATIENT)
Dept: OPERATING ROOM | Facility: HOSPITAL | Age: 59
End: 2024-04-09
Payer: COMMERCIAL

## 2024-04-09 ENCOUNTER — APPOINTMENT (OUTPATIENT)
Dept: RADIOLOGY | Facility: HOSPITAL | Age: 59
End: 2024-04-09
Payer: COMMERCIAL

## 2024-04-09 ENCOUNTER — ANESTHESIA EVENT (OUTPATIENT)
Dept: OPERATING ROOM | Facility: HOSPITAL | Age: 59
End: 2024-04-09
Payer: COMMERCIAL

## 2024-04-09 ENCOUNTER — HOSPITAL ENCOUNTER (OUTPATIENT)
Facility: HOSPITAL | Age: 59
Discharge: HOME | End: 2024-04-10
Attending: ORTHOPAEDIC SURGERY | Admitting: ORTHOPAEDIC SURGERY
Payer: COMMERCIAL

## 2024-04-09 DIAGNOSIS — M17.12 ARTHRITIS OF LEFT KNEE: Primary | ICD-10-CM

## 2024-04-09 DIAGNOSIS — M15.9 PRIMARY OSTEOARTHRITIS INVOLVING MULTIPLE JOINTS: ICD-10-CM

## 2024-04-09 PROCEDURE — 2780000003 HC OR 278 NO HCPCS: Performed by: ORTHOPAEDIC SURGERY

## 2024-04-09 PROCEDURE — A4649 SURGICAL SUPPLIES: HCPCS | Performed by: ORTHOPAEDIC SURGERY

## 2024-04-09 PROCEDURE — 7100000002 HC RECOVERY ROOM TIME - EACH INCREMENTAL 1 MINUTE: Performed by: ORTHOPAEDIC SURGERY

## 2024-04-09 PROCEDURE — A27447 PR TOTAL KNEE ARTHROPLASTY: Performed by: NURSE ANESTHETIST, CERTIFIED REGISTERED

## 2024-04-09 PROCEDURE — 73560 X-RAY EXAM OF KNEE 1 OR 2: CPT | Mod: LEFT SIDE | Performed by: RADIOLOGY

## 2024-04-09 PROCEDURE — 97161 PT EVAL LOW COMPLEX 20 MIN: CPT | Mod: GP

## 2024-04-09 PROCEDURE — 2720000007 HC OR 272 NO HCPCS: Performed by: ORTHOPAEDIC SURGERY

## 2024-04-09 PROCEDURE — 3700000002 HC GENERAL ANESTHESIA TIME - EACH INCREMENTAL 1 MINUTE: Performed by: ORTHOPAEDIC SURGERY

## 2024-04-09 PROCEDURE — 7100000011 HC EXTENDED STAY RECOVERY HOURLY - NURSING UNIT

## 2024-04-09 PROCEDURE — 3700000001 HC GENERAL ANESTHESIA TIME - INITIAL BASE CHARGE: Performed by: ORTHOPAEDIC SURGERY

## 2024-04-09 PROCEDURE — A27447 PR TOTAL KNEE ARTHROPLASTY: Performed by: STUDENT IN AN ORGANIZED HEALTH CARE EDUCATION/TRAINING PROGRAM

## 2024-04-09 PROCEDURE — C1776 JOINT DEVICE (IMPLANTABLE): HCPCS | Performed by: ORTHOPAEDIC SURGERY

## 2024-04-09 PROCEDURE — 7100000001 HC RECOVERY ROOM TIME - INITIAL BASE CHARGE: Performed by: ORTHOPAEDIC SURGERY

## 2024-04-09 PROCEDURE — 97530 THERAPEUTIC ACTIVITIES: CPT | Mod: GP

## 2024-04-09 PROCEDURE — 3600000017 HC OR TIME - EACH INCREMENTAL 1 MINUTE - PROCEDURE LEVEL SIX: Performed by: ORTHOPAEDIC SURGERY

## 2024-04-09 PROCEDURE — 2500000005 HC RX 250 GENERAL PHARMACY W/O HCPCS: Performed by: ORTHOPAEDIC SURGERY

## 2024-04-09 PROCEDURE — 2500000004 HC RX 250 GENERAL PHARMACY W/ HCPCS (ALT 636 FOR OP/ED): Performed by: STUDENT IN AN ORGANIZED HEALTH CARE EDUCATION/TRAINING PROGRAM

## 2024-04-09 PROCEDURE — 2500000004 HC RX 250 GENERAL PHARMACY W/ HCPCS (ALT 636 FOR OP/ED): Performed by: ORTHOPAEDIC SURGERY

## 2024-04-09 PROCEDURE — G0378 HOSPITAL OBSERVATION PER HR: HCPCS

## 2024-04-09 PROCEDURE — 2500000001 HC RX 250 WO HCPCS SELF ADMINISTERED DRUGS (ALT 637 FOR MEDICARE OP): Performed by: ORTHOPAEDIC SURGERY

## 2024-04-09 PROCEDURE — 2500000004 HC RX 250 GENERAL PHARMACY W/ HCPCS (ALT 636 FOR OP/ED): Performed by: NURSE ANESTHETIST, CERTIFIED REGISTERED

## 2024-04-09 PROCEDURE — 3600000018 HC OR TIME - INITIAL BASE CHARGE - PROCEDURE LEVEL SIX: Performed by: ORTHOPAEDIC SURGERY

## 2024-04-09 PROCEDURE — 2500000005 HC RX 250 GENERAL PHARMACY W/O HCPCS: Performed by: NURSE ANESTHETIST, CERTIFIED REGISTERED

## 2024-04-09 PROCEDURE — 64447 NJX AA&/STRD FEMORAL NRV IMG: CPT | Performed by: STUDENT IN AN ORGANIZED HEALTH CARE EDUCATION/TRAINING PROGRAM

## 2024-04-09 PROCEDURE — 73560 X-RAY EXAM OF KNEE 1 OR 2: CPT | Mod: LT

## 2024-04-09 DEVICE — IMPLANTABLE DEVICE: Type: IMPLANTABLE DEVICE | Site: KNEE | Status: FUNCTIONAL

## 2024-04-09 DEVICE — BASEPLATE, TIBIA 4 TS TRIATH: Type: IMPLANTABLE DEVICE | Site: KNEE | Status: FUNCTIONAL

## 2024-04-09 DEVICE — SIMPLEX® HV IS A FAST-SETTING ACRYLIC RESIN FOR USE IN BONE SURGERY. MIXING THE TWO SEPARATE STERILE COMPONENTS PRODUCES A DUCTILE BONE CEMENT WHICH, AFTER HARDENING, FIXES THE IMPLANT AND TRANSFERS STRESSES PRODUCED DURING MOVEMENT EVENLY TO THE BONE. SIMPLEX® HV CEMENT POWDER ALSO CONTAINS INSOLUBLE ZIRCONIUM DIOXIDE AS AN X-RAY CONTRAST MEDIUM. SIMPLEX® HV DOES NOT EMIT A SIGNAL AND DOES NOT POSE A SAFETY RISK IN A MAGNETIC RESONANCE ENVIRONMENT.
Type: IMPLANTABLE DEVICE | Site: KNEE | Status: FUNCTIONAL
Brand: SIMPLEX HV

## 2024-04-09 DEVICE — IMPLANTABLE DEVICE: Type: IMPLANTABLE DEVICE | Site: PATELLA | Status: FUNCTIONAL

## 2024-04-09 RX ORDER — FENTANYL CITRATE 50 UG/ML
50 INJECTION, SOLUTION INTRAMUSCULAR; INTRAVENOUS ONCE
Status: COMPLETED | OUTPATIENT
Start: 2024-04-09 | End: 2024-04-09

## 2024-04-09 RX ORDER — PROCHLORPERAZINE EDISYLATE 5 MG/ML
5 INJECTION INTRAMUSCULAR; INTRAVENOUS ONCE AS NEEDED
Status: DISCONTINUED | OUTPATIENT
Start: 2024-04-09 | End: 2024-04-09 | Stop reason: HOSPADM

## 2024-04-09 RX ORDER — SODIUM CHLORIDE, SODIUM LACTATE, POTASSIUM CHLORIDE, CALCIUM CHLORIDE 600; 310; 30; 20 MG/100ML; MG/100ML; MG/100ML; MG/100ML
125 INJECTION, SOLUTION INTRAVENOUS CONTINUOUS
Status: ACTIVE | OUTPATIENT
Start: 2024-04-09 | End: 2024-04-10

## 2024-04-09 RX ORDER — HYDRALAZINE HYDROCHLORIDE 20 MG/ML
5 INJECTION INTRAMUSCULAR; INTRAVENOUS EVERY 30 MIN PRN
Status: DISCONTINUED | OUTPATIENT
Start: 2024-04-09 | End: 2024-04-09 | Stop reason: HOSPADM

## 2024-04-09 RX ORDER — SODIUM CHLORIDE, SODIUM LACTATE, POTASSIUM CHLORIDE, CALCIUM CHLORIDE 600; 310; 30; 20 MG/100ML; MG/100ML; MG/100ML; MG/100ML
100 INJECTION, SOLUTION INTRAVENOUS CONTINUOUS
Status: DISCONTINUED | OUTPATIENT
Start: 2024-04-09 | End: 2024-04-09

## 2024-04-09 RX ORDER — ASPIRIN 81 MG/1
81 TABLET ORAL 2 TIMES DAILY
Status: DISCONTINUED | OUTPATIENT
Start: 2024-04-09 | End: 2024-04-10 | Stop reason: HOSPADM

## 2024-04-09 RX ORDER — POLYETHYLENE GLYCOL 3350 17 G/17G
17 POWDER, FOR SOLUTION ORAL DAILY
Status: DISCONTINUED | OUTPATIENT
Start: 2024-04-09 | End: 2024-04-10 | Stop reason: HOSPADM

## 2024-04-09 RX ORDER — OXYCODONE HCL 20 MG/1
20 TABLET, FILM COATED, EXTENDED RELEASE ORAL ONCE
Status: COMPLETED | OUTPATIENT
Start: 2024-04-09 | End: 2024-04-09

## 2024-04-09 RX ORDER — OXYCODONE HYDROCHLORIDE 5 MG/1
5 TABLET ORAL EVERY 4 HOURS PRN
Status: DISCONTINUED | OUTPATIENT
Start: 2024-04-09 | End: 2024-04-10 | Stop reason: HOSPADM

## 2024-04-09 RX ORDER — FENTANYL CITRATE 50 UG/ML
INJECTION, SOLUTION INTRAMUSCULAR; INTRAVENOUS AS NEEDED
Status: DISCONTINUED | OUTPATIENT
Start: 2024-04-09 | End: 2024-04-09

## 2024-04-09 RX ORDER — LIDOCAINE HYDROCHLORIDE 10 MG/ML
INJECTION INFILTRATION; PERINEURAL AS NEEDED
Status: DISCONTINUED | OUTPATIENT
Start: 2024-04-09 | End: 2024-04-09

## 2024-04-09 RX ORDER — ALBUTEROL SULFATE 0.83 MG/ML
2.5 SOLUTION RESPIRATORY (INHALATION) EVERY 6 HOURS PRN
Status: DISCONTINUED | OUTPATIENT
Start: 2024-04-09 | End: 2024-04-10 | Stop reason: HOSPADM

## 2024-04-09 RX ORDER — MIDAZOLAM HYDROCHLORIDE 1 MG/ML
2 INJECTION, SOLUTION INTRAMUSCULAR; INTRAVENOUS ONCE
Status: COMPLETED | OUTPATIENT
Start: 2024-04-09 | End: 2024-04-09

## 2024-04-09 RX ORDER — HYDROMORPHONE HYDROCHLORIDE 2 MG/ML
INJECTION, SOLUTION INTRAMUSCULAR; INTRAVENOUS; SUBCUTANEOUS AS NEEDED
Status: DISCONTINUED | OUTPATIENT
Start: 2024-04-09 | End: 2024-04-09

## 2024-04-09 RX ORDER — ACETAMINOPHEN 325 MG/1
650 TABLET ORAL ONCE
Status: COMPLETED | OUTPATIENT
Start: 2024-04-09 | End: 2024-04-09

## 2024-04-09 RX ORDER — LABETALOL HYDROCHLORIDE 5 MG/ML
5 INJECTION, SOLUTION INTRAVENOUS ONCE AS NEEDED
Status: DISCONTINUED | OUTPATIENT
Start: 2024-04-09 | End: 2024-04-09 | Stop reason: HOSPADM

## 2024-04-09 RX ORDER — CEFAZOLIN SODIUM 2 G/100ML
2 INJECTION, SOLUTION INTRAVENOUS ONCE
Status: COMPLETED | OUTPATIENT
Start: 2024-04-09 | End: 2024-04-09

## 2024-04-09 RX ORDER — KETOROLAC TROMETHAMINE 30 MG/ML
15 INJECTION, SOLUTION INTRAMUSCULAR; INTRAVENOUS EVERY 6 HOURS PRN
Status: DISCONTINUED | OUTPATIENT
Start: 2024-04-09 | End: 2024-04-10 | Stop reason: HOSPADM

## 2024-04-09 RX ORDER — ALBUTEROL SULFATE 90 UG/1
2 AEROSOL, METERED RESPIRATORY (INHALATION) EVERY 6 HOURS PRN
Status: DISCONTINUED | OUTPATIENT
Start: 2024-04-09 | End: 2024-04-09

## 2024-04-09 RX ORDER — ONDANSETRON HYDROCHLORIDE 2 MG/ML
INJECTION, SOLUTION INTRAVENOUS AS NEEDED
Status: DISCONTINUED | OUTPATIENT
Start: 2024-04-09 | End: 2024-04-09

## 2024-04-09 RX ORDER — DOCUSATE SODIUM 100 MG/1
100 CAPSULE, LIQUID FILLED ORAL 2 TIMES DAILY
Status: DISCONTINUED | OUTPATIENT
Start: 2024-04-09 | End: 2024-04-10 | Stop reason: HOSPADM

## 2024-04-09 RX ORDER — PROPOFOL 10 MG/ML
INJECTION, EMULSION INTRAVENOUS AS NEEDED
Status: DISCONTINUED | OUTPATIENT
Start: 2024-04-09 | End: 2024-04-09

## 2024-04-09 RX ORDER — CHOLESTYRAMINE 4 G/4.8G
4 POWDER, FOR SUSPENSION ORAL 2 TIMES DAILY
Status: DISCONTINUED | OUTPATIENT
Start: 2024-04-09 | End: 2024-04-10 | Stop reason: HOSPADM

## 2024-04-09 RX ORDER — DIPHENHYDRAMINE HYDROCHLORIDE 50 MG/ML
12.5 INJECTION INTRAMUSCULAR; INTRAVENOUS ONCE AS NEEDED
Status: DISCONTINUED | OUTPATIENT
Start: 2024-04-09 | End: 2024-04-09 | Stop reason: HOSPADM

## 2024-04-09 RX ORDER — IPRATROPIUM BROMIDE AND ALBUTEROL SULFATE 2.5; .5 MG/3ML; MG/3ML
3 SOLUTION RESPIRATORY (INHALATION) EVERY 2 HOUR PRN
Status: DISCONTINUED | OUTPATIENT
Start: 2024-04-09 | End: 2024-04-10 | Stop reason: HOSPADM

## 2024-04-09 RX ORDER — SODIUM CHLORIDE, SODIUM LACTATE, POTASSIUM CHLORIDE, CALCIUM CHLORIDE 600; 310; 30; 20 MG/100ML; MG/100ML; MG/100ML; MG/100ML
100 INJECTION, SOLUTION INTRAVENOUS CONTINUOUS
Status: DISCONTINUED | OUTPATIENT
Start: 2024-04-09 | End: 2024-04-09 | Stop reason: HOSPADM

## 2024-04-09 RX ORDER — OXYCODONE HYDROCHLORIDE 5 MG/1
10 TABLET ORAL EVERY 4 HOURS PRN
Status: DISCONTINUED | OUTPATIENT
Start: 2024-04-09 | End: 2024-04-10 | Stop reason: HOSPADM

## 2024-04-09 RX ORDER — CELECOXIB 200 MG/1
400 CAPSULE ORAL ONCE
Status: COMPLETED | OUTPATIENT
Start: 2024-04-09 | End: 2024-04-09

## 2024-04-09 RX ORDER — PREGABALIN 75 MG/1
75 CAPSULE ORAL ONCE
Status: COMPLETED | OUTPATIENT
Start: 2024-04-09 | End: 2024-04-09

## 2024-04-09 RX ORDER — BISMUTH SUBSALICYLATE 262 MG
1 TABLET,CHEWABLE ORAL DAILY
Status: DISCONTINUED | OUTPATIENT
Start: 2024-04-09 | End: 2024-04-10 | Stop reason: HOSPADM

## 2024-04-09 RX ORDER — IPRATROPIUM BROMIDE 0.5 MG/2.5ML
500 SOLUTION RESPIRATORY (INHALATION) AS NEEDED
Status: DISCONTINUED | OUTPATIENT
Start: 2024-04-09 | End: 2024-04-09 | Stop reason: HOSPADM

## 2024-04-09 RX ORDER — FENTANYL CITRATE 50 UG/ML
25 INJECTION, SOLUTION INTRAMUSCULAR; INTRAVENOUS EVERY 5 MIN PRN
Status: DISCONTINUED | OUTPATIENT
Start: 2024-04-09 | End: 2024-04-09 | Stop reason: HOSPADM

## 2024-04-09 RX ORDER — CEFAZOLIN SODIUM 2 G/100ML
2 INJECTION, SOLUTION INTRAVENOUS EVERY 8 HOURS
Status: COMPLETED | OUTPATIENT
Start: 2024-04-09 | End: 2024-04-10

## 2024-04-09 RX ORDER — FENTANYL CITRATE 50 UG/ML
50 INJECTION, SOLUTION INTRAMUSCULAR; INTRAVENOUS EVERY 5 MIN PRN
Status: DISCONTINUED | OUTPATIENT
Start: 2024-04-09 | End: 2024-04-09 | Stop reason: HOSPADM

## 2024-04-09 RX ORDER — MORPHINE SULFATE 2 MG/ML
2 INJECTION, SOLUTION INTRAMUSCULAR; INTRAVENOUS EVERY 2 HOUR PRN
Status: DISCONTINUED | OUTPATIENT
Start: 2024-04-09 | End: 2024-04-10 | Stop reason: HOSPADM

## 2024-04-09 RX ORDER — ACETAMINOPHEN 500 MG
1000 TABLET ORAL EVERY 6 HOURS SCHEDULED
Status: DISCONTINUED | OUTPATIENT
Start: 2024-04-09 | End: 2024-04-10 | Stop reason: HOSPADM

## 2024-04-09 RX ORDER — ALBUTEROL SULFATE 0.83 MG/ML
2.5 SOLUTION RESPIRATORY (INHALATION) ONCE AS NEEDED
Status: DISCONTINUED | OUTPATIENT
Start: 2024-04-09 | End: 2024-04-09 | Stop reason: HOSPADM

## 2024-04-09 RX ORDER — FLUTICASONE FUROATE AND VILANTEROL 200; 25 UG/1; UG/1
1 POWDER RESPIRATORY (INHALATION)
Status: DISCONTINUED | OUTPATIENT
Start: 2024-04-09 | End: 2024-04-10 | Stop reason: HOSPADM

## 2024-04-09 RX ORDER — ONDANSETRON HYDROCHLORIDE 2 MG/ML
4 INJECTION, SOLUTION INTRAVENOUS EVERY 8 HOURS PRN
Status: DISCONTINUED | OUTPATIENT
Start: 2024-04-09 | End: 2024-04-10 | Stop reason: HOSPADM

## 2024-04-09 RX ORDER — PHENYLEPHRINE HCL IN 0.9% NACL 1 MG/10 ML
SYRINGE (ML) INTRAVENOUS AS NEEDED
Status: DISCONTINUED | OUTPATIENT
Start: 2024-04-09 | End: 2024-04-09

## 2024-04-09 RX ORDER — MEPERIDINE HYDROCHLORIDE 25 MG/ML
12.5 INJECTION INTRAMUSCULAR; INTRAVENOUS; SUBCUTANEOUS EVERY 10 MIN PRN
Status: DISCONTINUED | OUTPATIENT
Start: 2024-04-09 | End: 2024-04-09 | Stop reason: HOSPADM

## 2024-04-09 RX ORDER — CHOLESTYRAMINE 4 G/4.8G
4 POWDER, FOR SUSPENSION ORAL 2 TIMES DAILY
Status: DISCONTINUED | OUTPATIENT
Start: 2024-04-10 | End: 2024-04-09

## 2024-04-09 RX ORDER — TRANEXAMIC ACID 100 MG/ML
INJECTION, SOLUTION INTRAVENOUS AS NEEDED
Status: DISCONTINUED | OUTPATIENT
Start: 2024-04-09 | End: 2024-04-09

## 2024-04-09 RX ORDER — ONDANSETRON HYDROCHLORIDE 2 MG/ML
4 INJECTION, SOLUTION INTRAVENOUS ONCE AS NEEDED
Status: DISCONTINUED | OUTPATIENT
Start: 2024-04-09 | End: 2024-04-09 | Stop reason: HOSPADM

## 2024-04-09 RX ADMIN — HYDROMORPHONE HYDROCHLORIDE 0.6 MG: 2 INJECTION INTRAMUSCULAR; INTRAVENOUS; SUBCUTANEOUS at 14:11

## 2024-04-09 RX ADMIN — CELECOXIB 400 MG: 200 CAPSULE ORAL at 08:46

## 2024-04-09 RX ADMIN — HYDROMORPHONE HYDROCHLORIDE 0.2 MG: 2 INJECTION INTRAMUSCULAR; INTRAVENOUS; SUBCUTANEOUS at 13:01

## 2024-04-09 RX ADMIN — HYDROMORPHONE HYDROCHLORIDE 0.6 MG: 2 INJECTION INTRAMUSCULAR; INTRAVENOUS; SUBCUTANEOUS at 14:03

## 2024-04-09 RX ADMIN — POVIDONE-IODINE 1 APPLICATION: 5 SOLUTION TOPICAL at 08:47

## 2024-04-09 RX ADMIN — CEFAZOLIN SODIUM 2 G: 2 INJECTION, SOLUTION INTRAVENOUS at 17:38

## 2024-04-09 RX ADMIN — SODIUM CHLORIDE, POTASSIUM CHLORIDE, SODIUM LACTATE AND CALCIUM CHLORIDE: 600; 310; 30; 20 INJECTION, SOLUTION INTRAVENOUS at 11:12

## 2024-04-09 RX ADMIN — FENTANYL CITRATE 50 MCG: 0.05 INJECTION, SOLUTION INTRAMUSCULAR; INTRAVENOUS at 11:16

## 2024-04-09 RX ADMIN — CEFAZOLIN SODIUM 2 G: 2 INJECTION, SOLUTION INTRAVENOUS at 11:16

## 2024-04-09 RX ADMIN — ONDANSETRON HYDROCHLORIDE 4 MG: 2 INJECTION INTRAMUSCULAR; INTRAVENOUS at 13:30

## 2024-04-09 RX ADMIN — PREGABALIN 75 MG: 75 CAPSULE ORAL at 08:46

## 2024-04-09 RX ADMIN — PROPOFOL 200 MG: 10 INJECTION, EMULSION INTRAVENOUS at 11:16

## 2024-04-09 RX ADMIN — Medication 100 MCG: at 13:44

## 2024-04-09 RX ADMIN — MIDAZOLAM 2 MG: 1 INJECTION INTRAMUSCULAR; INTRAVENOUS at 10:32

## 2024-04-09 RX ADMIN — TRANEXAMIC ACID 1000 MG: 100 INJECTION, SOLUTION INTRAVENOUS at 12:42

## 2024-04-09 RX ADMIN — HYDROMORPHONE HYDROCHLORIDE 0.2 MG: 2 INJECTION INTRAMUSCULAR; INTRAVENOUS; SUBCUTANEOUS at 12:45

## 2024-04-09 RX ADMIN — LIDOCAINE HYDROCHLORIDE 5 ML: 10 INJECTION, SOLUTION INFILTRATION; PERINEURAL at 11:16

## 2024-04-09 RX ADMIN — SODIUM CHLORIDE, SODIUM LACTATE, POTASSIUM CHLORIDE, AND CALCIUM CHLORIDE 125 ML/HR: 600; 310; 30; 20 INJECTION, SOLUTION INTRAVENOUS at 22:54

## 2024-04-09 RX ADMIN — ACETAMINOPHEN 1000 MG: 500 TABLET ORAL at 17:21

## 2024-04-09 RX ADMIN — FENTANYL CITRATE 50 MCG: 50 INJECTION INTRAMUSCULAR; INTRAVENOUS at 10:31

## 2024-04-09 RX ADMIN — FENTANYL CITRATE 25 MCG: 0.05 INJECTION, SOLUTION INTRAMUSCULAR; INTRAVENOUS at 11:39

## 2024-04-09 RX ADMIN — ACETAMINOPHEN 650 MG: 325 TABLET ORAL at 08:46

## 2024-04-09 RX ADMIN — FENTANYL CITRATE 50 MCG: 50 INJECTION INTRAMUSCULAR; INTRAVENOUS at 14:20

## 2024-04-09 RX ADMIN — OXYCODONE HYDROCHLORIDE 20 MG: 20 TABLET, FILM COATED, EXTENDED RELEASE ORAL at 08:47

## 2024-04-09 RX ADMIN — ASPIRIN 81 MG: 81 TABLET, COATED ORAL at 22:46

## 2024-04-09 RX ADMIN — OXYCODONE 10 MG: 5 TABLET ORAL at 22:46

## 2024-04-09 RX ADMIN — HYDROMORPHONE HYDROCHLORIDE 0.2 MG: 2 INJECTION INTRAMUSCULAR; INTRAVENOUS; SUBCUTANEOUS at 11:48

## 2024-04-09 RX ADMIN — OXYCODONE 10 MG: 5 TABLET ORAL at 17:58

## 2024-04-09 RX ADMIN — Medication 100 MCG: at 11:31

## 2024-04-09 RX ADMIN — MULTIVITAMIN TABLET 1 TABLET: TABLET at 17:21

## 2024-04-09 RX ADMIN — FENTANYL CITRATE 50 MCG: 50 INJECTION INTRAMUSCULAR; INTRAVENOUS at 14:30

## 2024-04-09 RX ADMIN — FENTANYL CITRATE 25 MCG: 0.05 INJECTION, SOLUTION INTRAMUSCULAR; INTRAVENOUS at 11:43

## 2024-04-09 RX ADMIN — DEXAMETHASONE SODIUM PHOSPHATE 10 MG: 4 INJECTION, SOLUTION INTRAMUSCULAR; INTRAVENOUS at 11:16

## 2024-04-09 RX ADMIN — TRANEXAMIC ACID 1000 MG: 100 INJECTION, SOLUTION INTRAVENOUS at 11:29

## 2024-04-09 RX ADMIN — HYDROMORPHONE HYDROCHLORIDE 0.2 MG: 2 INJECTION INTRAMUSCULAR; INTRAVENOUS; SUBCUTANEOUS at 12:54

## 2024-04-09 RX ADMIN — SODIUM CHLORIDE, SODIUM LACTATE, POTASSIUM CHLORIDE, AND CALCIUM CHLORIDE 125 ML/HR: 600; 310; 30; 20 INJECTION, SOLUTION INTRAVENOUS at 16:27

## 2024-04-09 RX ADMIN — Medication 2 L/MIN: at 16:30

## 2024-04-09 SDOH — SOCIAL STABILITY: SOCIAL INSECURITY: ARE THERE ANY APPARENT SIGNS OF INJURIES/BEHAVIORS THAT COULD BE RELATED TO ABUSE/NEGLECT?: NO

## 2024-04-09 SDOH — SOCIAL STABILITY: SOCIAL INSECURITY: DO YOU FEEL ANYONE HAS EXPLOITED OR TAKEN ADVANTAGE OF YOU FINANCIALLY OR OF YOUR PERSONAL PROPERTY?: NO

## 2024-04-09 SDOH — SOCIAL STABILITY: SOCIAL INSECURITY: DO YOU FEEL UNSAFE GOING BACK TO THE PLACE WHERE YOU ARE LIVING?: NO

## 2024-04-09 SDOH — HEALTH STABILITY: MENTAL HEALTH: CURRENT SMOKER: 0

## 2024-04-09 SDOH — SOCIAL STABILITY: SOCIAL INSECURITY: HAS ANYONE EVER THREATENED TO HURT YOUR FAMILY OR YOUR PETS?: NO

## 2024-04-09 SDOH — SOCIAL STABILITY: SOCIAL INSECURITY: ARE YOU OR HAVE YOU BEEN THREATENED OR ABUSED PHYSICALLY, EMOTIONALLY, OR SEXUALLY BY ANYONE?: NO

## 2024-04-09 SDOH — SOCIAL STABILITY: SOCIAL INSECURITY: WERE YOU ABLE TO COMPLETE ALL THE BEHAVIORAL HEALTH SCREENINGS?: YES

## 2024-04-09 SDOH — SOCIAL STABILITY: SOCIAL INSECURITY: HAVE YOU HAD THOUGHTS OF HARMING ANYONE ELSE?: NO

## 2024-04-09 SDOH — SOCIAL STABILITY: SOCIAL INSECURITY: DOES ANYONE TRY TO KEEP YOU FROM HAVING/CONTACTING OTHER FRIENDS OR DOING THINGS OUTSIDE YOUR HOME?: NO

## 2024-04-09 SDOH — SOCIAL STABILITY: SOCIAL INSECURITY: ABUSE: ADULT

## 2024-04-09 ASSESSMENT — ACTIVITIES OF DAILY LIVING (ADL)
BATHING: INDEPENDENT
WALKS IN HOME: INDEPENDENT
JUDGMENT_ADEQUATE_SAFELY_COMPLETE_DAILY_ACTIVITIES: YES
ADEQUATE_TO_COMPLETE_ADL: YES
ADLS_ADDRESSED: NO
FEEDING YOURSELF: INDEPENDENT
TOILETING: INDEPENDENT
LACK_OF_TRANSPORTATION: NO
ADL_ASSISTANCE: INDEPENDENT
HEARING - LEFT EAR: FUNCTIONAL
GROOMING: INDEPENDENT
HEARING - RIGHT EAR: FUNCTIONAL
DRESSING YOURSELF: INDEPENDENT
PATIENT'S MEMORY ADEQUATE TO SAFELY COMPLETE DAILY ACTIVITIES?: YES

## 2024-04-09 ASSESSMENT — ENCOUNTER SYMPTOMS
NEUROLOGICAL NEGATIVE: 1
EYES NEGATIVE: 1
CARDIOVASCULAR NEGATIVE: 1
SHORTNESS OF BREATH: 1
ENDOCRINE NEGATIVE: 1
CONSTITUTIONAL NEGATIVE: 1
PSYCHIATRIC NEGATIVE: 1
GASTROINTESTINAL NEGATIVE: 1

## 2024-04-09 ASSESSMENT — COGNITIVE AND FUNCTIONAL STATUS - GENERAL
MOVING FROM LYING ON BACK TO SITTING ON SIDE OF FLAT BED WITH BEDRAILS: A LITTLE
DRESSING REGULAR LOWER BODY CLOTHING: A LITTLE
MOVING FROM LYING ON BACK TO SITTING ON SIDE OF FLAT BED WITH BEDRAILS: A LITTLE
MOVING TO AND FROM BED TO CHAIR: A LITTLE
MOBILITY SCORE: 18
TURNING FROM BACK TO SIDE WHILE IN FLAT BAD: A LITTLE
STANDING UP FROM CHAIR USING ARMS: A LITTLE
MOVING TO AND FROM BED TO CHAIR: A LITTLE
WALKING IN HOSPITAL ROOM: A LITTLE
CLIMB 3 TO 5 STEPS WITH RAILING: A LOT
STANDING UP FROM CHAIR USING ARMS: A LITTLE
MOBILITY SCORE: 17
MOVING FROM LYING ON BACK TO SITTING ON SIDE OF FLAT BED WITH BEDRAILS: A LITTLE
STANDING UP FROM CHAIR USING ARMS: A LITTLE
TURNING FROM BACK TO SIDE WHILE IN FLAT BAD: A LITTLE
DRESSING REGULAR LOWER BODY CLOTHING: A LITTLE
MOBILITY SCORE: 17
WALKING IN HOSPITAL ROOM: A LITTLE
CLIMB 3 TO 5 STEPS WITH RAILING: A LOT
WALKING IN HOSPITAL ROOM: A LITTLE
TURNING FROM BACK TO SIDE WHILE IN FLAT BAD: A LITTLE
MOVING FROM LYING ON BACK TO SITTING ON SIDE OF FLAT BED WITH BEDRAILS: A LITTLE
DAILY ACTIVITIY SCORE: 23
WALKING IN HOSPITAL ROOM: A LITTLE
MOBILITY SCORE: 17
CLIMB 3 TO 5 STEPS WITH RAILING: A LOT
PATIENT BASELINE BEDBOUND: NO
STANDING UP FROM CHAIR USING ARMS: A LITTLE
MOVING TO AND FROM BED TO CHAIR: A LITTLE
CLIMB 3 TO 5 STEPS WITH RAILING: A LITTLE
DAILY ACTIVITIY SCORE: 23
MOVING TO AND FROM BED TO CHAIR: A LITTLE
TURNING FROM BACK TO SIDE WHILE IN FLAT BAD: A LITTLE

## 2024-04-09 ASSESSMENT — PAIN SCALES - GENERAL
PAINLEVEL_OUTOF10: 6
PAINLEVEL_OUTOF10: 9
PAINLEVEL_OUTOF10: 5 - MODERATE PAIN
PAINLEVEL_OUTOF10: 3
PAINLEVEL_OUTOF10: 7
PAINLEVEL_OUTOF10: 8
PAINLEVEL_OUTOF10: 7
PAINLEVEL_OUTOF10: 6
PAINLEVEL_OUTOF10: 9
PAINLEVEL_OUTOF10: 5 - MODERATE PAIN
PAINLEVEL_OUTOF10: 6

## 2024-04-09 ASSESSMENT — PAIN - FUNCTIONAL ASSESSMENT
PAIN_FUNCTIONAL_ASSESSMENT: 0-10

## 2024-04-09 ASSESSMENT — COLUMBIA-SUICIDE SEVERITY RATING SCALE - C-SSRS
6. HAVE YOU EVER DONE ANYTHING, STARTED TO DO ANYTHING, OR PREPARED TO DO ANYTHING TO END YOUR LIFE?: NO
1. IN THE PAST MONTH, HAVE YOU WISHED YOU WERE DEAD OR WISHED YOU COULD GO TO SLEEP AND NOT WAKE UP?: NO
2. HAVE YOU ACTUALLY HAD ANY THOUGHTS OF KILLING YOURSELF?: NO

## 2024-04-09 ASSESSMENT — PATIENT HEALTH QUESTIONNAIRE - PHQ9
2. FEELING DOWN, DEPRESSED OR HOPELESS: NOT AT ALL
SUM OF ALL RESPONSES TO PHQ9 QUESTIONS 1 & 2: 0
1. LITTLE INTEREST OR PLEASURE IN DOING THINGS: NOT AT ALL

## 2024-04-09 ASSESSMENT — PAIN DESCRIPTION - DESCRIPTORS: DESCRIPTORS: ACHING

## 2024-04-09 ASSESSMENT — PAIN DESCRIPTION - LOCATION
LOCATION: KNEE
LOCATION: KNEE

## 2024-04-09 ASSESSMENT — LIFESTYLE VARIABLES
HOW OFTEN DO YOU HAVE 6 OR MORE DRINKS ON ONE OCCASION: NEVER
HOW OFTEN DO YOU HAVE A DRINK CONTAINING ALCOHOL: 4 OR MORE TIMES A WEEK
AUDIT-C TOTAL SCORE: 4
SKIP TO QUESTIONS 9-10: 1
HOW MANY STANDARD DRINKS CONTAINING ALCOHOL DO YOU HAVE ON A TYPICAL DAY: 1 OR 2
AUDIT-C TOTAL SCORE: 4

## 2024-04-09 ASSESSMENT — PAIN DESCRIPTION - ORIENTATION
ORIENTATION: LEFT
ORIENTATION: LEFT

## 2024-04-09 NOTE — ANESTHESIA PROCEDURE NOTES
Airway  Date/Time: 4/9/2024 11:19 AM  Urgency: elective    Airway not difficult    Staffing  Performed: [facility]   Authorized by: Jame Cervantes MD    Performed by: FARIBA Juan-CRNA  Patient location during procedure: OR    Indications and Patient Condition  Indications for airway management: anesthesia  Spontaneous Ventilation: absent  Sedation level: deep  Preoxygenated: yes  Patient position: sniffing  MILS maintained throughout  Mask difficulty assessment: 0 - not attempted    Final Airway Details  Final airway type: supraglottic airway      Successful airway: Size 4     Number of attempts at approach: 1

## 2024-04-09 NOTE — NURSING NOTE
Patient arrived to unit from PACU in stable condition. Patient is awake, A&O X 4. Educated patient on use of call light/bed controls/pain med regimen/calling for assistance. All needs addressed at this time. Call light within reach. Spouse at bedside.

## 2024-04-09 NOTE — PROGRESS NOTES
Physical Therapy    Physical Therapy Evaluation & Treatment    Patient Name: Stacey Dyer  MRN: [id number]  Today's Date: 4/9/2024   Time Calculation  Start Time: 1625  Stop Time: 1700  Time Calculation (min): 35 min    Assessment/Plan   PT Assessment  PT Assessment Results: Decreased strength, Decreased range of motion, Decreased endurance, Impaired balance, Decreased mobility, Impaired sensation, Decreased skin integrity, Orthopedic restrictions, Pain  Rehab Prognosis: Good  Evaluation/Treatment Tolerance: Patient tolerated treatment well  Medical Staff Made Aware: Yes  Strengths: Premorbid level of function, Support of Caregivers, Housing layout  End of Session Communication: PCT/NA/CTA  Assessment Comment: Pt is a 59 y.o. female adm for elective Lt TKA. Pt was IND PTA, used no AD, lives w/ spouse in a multilevel home, has first floor bed and bath, but does use her upstairs. Pt moving at mid assist level on eval , reports moderate pain level. Pt would benefit from continued skilled PT services to progress ROM, strength, endurance and safe mobility post op.  End of Session Patient Position: Bed, 3 rail up, Alarm on (set up w/ dinner, needs in reach.)   IP OR SWING BED PT PLAN  Inpatient or Swing Bed: Inpatient  PT Plan  Treatment/Interventions: Bed mobility, Transfer training, Gait training, Stair training, Balance training, Strengthening, Endurance training, Therapeutic exercise, Range of motion, Therapeutic activity, Home exercise program  PT Plan: Skilled PT  PT Frequency: BID (12 x week)  PT Discharge Recommendations: Low intensity level of continued care  Equipment Recommended upon Discharge: Wheeled walker (WILL NEED NEW 2WW ISSUED)  PT Recommended Transfer Status: Assist x1, Assistive device  PT - OK to Discharge: Yes      Subjective     General Visit Information:  General  Reason for Referral: recent surgery; s/p Lt TKA  Referred By: Dr Mack  Past Medical History Relevant to Rehab: OA, asthma, carcinoid  "tumor s/p partial colectomy  Family/Caregiver Present: Yes  Caregiver Feedback: Spouse, Jules, supportive  Prior to Session Communication: Bedside nurse  Patient Position Received: Bed, 4 rail up, Alarm off, not on at start of session  Preferred Learning Style: verbal, visual  General Comment: Pt agreeable to PT eval, reports still feeling a little \"loopy.\"  Home Living:  Home Living  Type of Home: House  Lives With: Spouse  Home Adaptive Equipment: Walker rolling or standard, Cane, LSH, Reacher (borrowed rollator)  Home Layout: Two level  Home Access: Stairs to enter without rails  Entrance Stairs-Rails: None  Entrance Stairs-Number of Steps: 1  Bathroom Shower/Tub: Walk-in shower  Bathroom Toilet: Standard  Bathroom Equipment: Built-in shower seat  Bathroom Accessibility: bed and bath on main  Home Living Comments: spouse will be able to be home when pt is discharged  Prior Level of Function:  Prior Function Per Pt/Caregiver Report  Level of [place]: Independent with ADLs and functional transfers, Independent with homemaking with ambulation  Receives Help From: Other (Comment) (spouse)  ADL Assistance: Independent  Homemaking Assistance: Independent  Ambulatory Assistance: Independent  Prior Function Comments: IND, drives, denies falls  Precautions:  Precautions  Hearing/Visual Limitations: reading glasses  LE Weight Bearing Status: Weight Bearing as Tolerated  Medical Precautions: Fall precautions, Oxygen therapy device and L/min (4L)  Post-Surgical Precautions: Left total knee precautions  Precautions Comment: Arthritic Rt knee as well.    Objective   Pain:  Pain Assessment  Pain Assessment: 0-10  Pain Score: 6  Pain Type: Surgical pain  Pain Location: Knee  Pain Orientation: Left  Cognition:  Cognition  Overall Cognitive Status: Within Functional Limits (slightly anxious)  Orientation Level: Oriented X4    General Assessments:  Activity Tolerance  Activity Tolerance Comments: Fair post " op    Sensation  Sensation Comment: denied abn sensation at baseline; mild numbness around Lt knee       Dynamic Standing Balance  Dynamic Standing-Balance Support: Bilateral upper extremity supported (RW)  Dynamic Standing-Balance:  (sidesteps, amb forward/back)  Dynamic Standing-Comments: Fair/Fair-, no buckling, somewhat guarded  Functional Assessments:  Bed Mobility  Bed Mobility: Yes  Bed Mobility 1  Bed Mobility 1: Supine to sitting, Sitting to supine  Level of Assistance 1: Close supervision, Contact guard, Minimal verbal cues  Bed Mobility Comments 1: to Rt EOB, HOB nearly flat, slight effort for LLE    Transfers  Transfer: Yes  Transfer 1  Technique 1: Stand to sit, Sit to stand  Transfer Device 1: Walker  Transfer Level of Assistance 1: Minimum assistance, Minimal verbal cues  Trials/Comments 1: Performed from/to EOB, cues for safe hand placement    Ambulation/Gait Training  Ambulation/Gait Training Performed: Yes  Ambulation/Gait Training 1  Surface 1: Level tile  Device 1: Rolling walker  Assistance 1: Minimum assistance, Minimal verbal cues  Comments/Distance (ft) 1: Pt able to take a couple sidesteps Rt to HOB, 2 steps bilat forward/back, cues for gait sequence. Pt a little guarded and anxious, no buckling.  Extremity/Trunk Assessments:  RLE   RLE : Within Functional Limits  LLE   LLE : Exceptions to WFL  AROM LLE (degrees)  LLE AROM Comment: gross strength 3-/5 or > at knee  L Knee Flexion 0-130: 80 (sitting EOB)  L Knee Extension 0-130: -10 (supine)  L Ankle Dorsiflexion 0-20: 15  Treatments:  Educated on and performed anti-embolics; encouraged pt to perform on own     Ambulation/Gait Training  Ambulation/Gait Training Performed: Yes  Ambulation/Gait Training 1  Surface 1: Level tile  Device 1: Rolling walker  Assistance 1: Minimum assistance, Minimal verbal cues  Comments/Distance (ft) 1: Pt able to take a couple sidesteps Rt to HOB, 2 steps bilat forward/back, cues for gait sequence. Pt a little  guarded and anxious, no buckling.  Transfers  Transfer: Yes  Transfer 1  Technique 1: Stand to sit, Sit to stand  Transfer Device 1: Walker  Transfer Level of Assistance 1: Minimum assistance, Minimal verbal cues  Trials/Comments 1: Performed from/to EOB, cues for safe hand placement  Outcome Measures:  [facility] Basic Mobility  Turning from your back to your side while in a flat bed without using bedrails: A little  Moving from lying on your back to sitting on the side of a flat bed without using bedrails: A little  Moving to and from bed to chair (including a wheelchair): A little  Standing up from a chair using your arms (e.g. wheelchair or bedside chair): A little  To walk in hospital room: A little  Climbing 3-5 steps with railing: A lot  Basic Mobility - Total Score: 17    Encounter Problems       Encounter Problems (Active)       Mobility       STG - Patient will ambulate 100' w/ supervision and RW (Progressing)       Start:  04/09/24    Expected End:  04/10/24            STG - Patient will ambulate up and down a curb/step w/ min assist and RW (Progressing)       Start:  04/09/24    Expected End:  04/10/24               Mobility       Pt will demonstrate understanding of post op TKA HEP.  (Progressing)       Start:  04/09/24    Expected End:  04/10/24               PT Transfers       STG - Patient to transfer to and from sit to supine IND (Progressing)       Start:  04/09/24    Expected End:  04/10/24            STG - Patient will transfer sit to and from stand MOD I (Progressing)       Start:  04/09/24    Expected End:  04/10/24                   Education Documentation  Precautions, taught by Viri Lomeli PT at 4/9/2024  5:40 PM.  Learner: Patient  Readiness: Acceptance  Method: Explanation  Response: Verbalizes Understanding, Needs Reinforcement  Comment: post op protocol, safety, technique comprehension    Home Exercise Program, taught by Viri Lomeli, PT at 4/9/2024  5:40 PM.  Learner:  Patient  Readiness: Acceptance  Method: Explanation  Response: Verbalizes Understanding, Needs Reinforcement  Comment: post op protocol, safety, technique comprehension    Mobility Training, taught by Viri Lomeli PT at 4/9/2024  5:40 PM.  Learner: Patient  Readiness: Acceptance  Method: Explanation  Response: Verbalizes Understanding, Needs Reinforcement  Comment: post op protocol, safety, technique comprehension    Education Comments  No comments found.

## 2024-04-09 NOTE — OP NOTE
Open Total Knee Arthroplasty  87452 - MO ARTHRP KNE CONDYLE&PLATU MEDIAL&LAT COMPARTMENTS   Operative Note     Date: 2024  OR Location: [facility]    Name: Stacey Dyer, : 1965, Age: 59 y.o., MRN: [id number], Sex: female    PRE Diagnosis  Left knee osteoarthritis    POST Diagnosis  Same    Procedures  Complex left total knee replacement-subvastus approach    Surgeons      * Rudy Mack - Primary    Resident/Fellow/Other Assistant:  rahel Hernández jade    Procedure Summary  Implants:  [place]: Size 5 posterior stabilized cemented femoral component, size 4 universal tibial baseplate with 5 mm augments medial and lateral and a 12 x 50 mm cemented stem, asymmetric 32 patella, size 4 x 13 posterior stabilized X.3 tibial bearing insert    Anesthesia: General, LMA with adductor canal block  ASA: II  Anesthesia Staff:   Anesthesiologist: Jame Cervantes MD  CRNA: FARIBA Juan-BARBARA  SRNA: Manjula Perla  Estimated Blood Loss: 75 mL    Specimen: No specimens collected     Staff:   Circulator: Floyd Sultana RN  Relief Circulator: Carlos Baltazar RN  Scrub Person: Shelly Miguel RN         Drains and/or Catheters:   2 Hemovac    Tourniquet Times:     Total Tourniquet Time Documented:  Thigh (Left) - 94 minutes  Total: Thigh (Left) - 94 minutes             Findings: Patient had severe wear of the posterior medial tibia requiring a deeper resection of the tibial component.  She had stable implantation of the above components otherwise.  Neutral alignment stable in extension to an axial loading exam and a varus valgus exam with firm endpoint.  Patella tracked normally through a subvastus approach.    Indications: Stacey Dyer is an 59 y.o. female who is having surgery for LEFT KNEE OSTEOATHIRIS.   Patient presented with severe pain and symptoms of her left knee.  Progressive over time.  Not relieved with conservative care.  Affecting daily activities she had reached the point of disability.   X-rays revealed severe degenerative change with bone-on-bone apposition along the medial joint space and osteophyte formation.  I discussed with her further options and she wished to proceed with a knee replacement surgery.  I discussed with the patient the risks, benefits, alternatives, complications of a total knee replacement.  The risks, including but not limited to, deep vein thrombosis, pulmonary embolism, infection, knee stiffness, periprosthetic fracture, damage to ligaments tendons or neurovascular structures was discussed.  Despite these risks they elected to proceed.      Procedure Details: Medications: Ancef 2 g IV, transaminase acid 2 g IV, Decadron 10 mg IV    Patient was seen and evaluated in the preoperative area.  Left knee marked as the operative extremity.  She was brought back to the operating room and anesthesia to control the head neck airway ministered general anesthetic.  She was supine.  Linder catheter was inserted.  Her well leg was wrapped and secured to the table.  I wrapped her thigh and applied a tourniquet.  We then washed the skin with chlorhexidine and alcohol and prepped in the usual manner with ChloraPrep.  Draped in usual manner and an operative timeout was performed.  Leg was exsanguinated with an Esmarch and elevated to 250 mmHg.    I marked out the anterior landmarks of her knee.  I marked out an anterior incision.  This was created with a scalpel.  I carried down through the subcutaneous tissue to the extensor mechanism with the Bovie.  I created a full-thickness skin flap medial laterally.  Identified the VMO.  I made a capsular incision just distal to the VMO insertion.  I placed a Hohmann retractor.  I marked out the subvastus approach.  I created this with a fresh scalpel.  I then subperiosteally dissected along the proximal tibia.  There are large osteophytes which were provisionally debrided with a rongeur.  I removed the infrapatellar fat pad.  I released the supers  patellar synovium and lateral synovium and was able to translate the patella laterally.  I flexed the knee and placed Z retractor and Hohmann retractors.  I debrided the ACL in the intercondylar notch.  I marked out Whitesides line.  I then used a entry reamer to enter the femoral canal.  I then placed the distal cutting jig with a 5 degree valgus alignment on the left side 8 mm resection.  I seated this on the more prominent lateral condyle.  I pinned the block in the position.  Protecting the soft tissues I then resected the distal femur.  My lateral cut measured 8 mm my medial cut measured 7.    I now turned my attention to the tibia.  I used an X medullary alignment guide centered in the tibial spines align with the medial third of the tubercle of the tibial crest in the center of her talus.  I adjusted for neutral slope.  She had a lot of wear along the medial aspect extending posteriorly.  I took a 2 mm resection off of the posterior horn aspect.  I pinned the block in the position.  I then used a drop gauri as a secondary check.  I then resected the proximal tibia.  I removed the tibial cut measured bilateral thickness of 12 mm.  I now extended the knee and used the gap balancing block.  This appeared to be symmetric balance of approximately 16 mm of thickness.  She has some generalized ligamentous laxity along the medial and lateral aspects.    I now returned to the femur.  I flexed the knee and placed the Z and Hohmann retractors to protect soft tissues.  I used the 3 degree X rotation sizing guide.  I seated this on the distal femur and alignment with respect to Whitesides line and epicondylar axis and my tibial cut.  I then pinned this in the position.  Her femur size between a 4 and a 5.  The size 4.  2 have a femoral notch.  I selected a size five 4-in-1 cutting block.  I pinned this in the position and I resected the anterior posterior condyles and chamfer cuts.  I then removed the bone and debrided the  osteophytes with a rongeur.  I now placed the box cutting guide.  This was overhanging both medial and laterally by 1 to 2 mm.  I centered it along the distal femur.  I pinned this in the position.  I then used the box cutting chisel and oscillating saw to resect the box and remove the PCL with the Bovie.  I then resected the medial and lateral meniscal remnants.  There is a large tear of the lateral meniscus posterior aspect.  I preserved the popliteus.  I then used a curved osteotome debride the posterior condylar osteophytes both medial and laterally.  There were several large ones removed from the medial aspect.  I then provisionally sized the tibia to a size 4.  I now trialed with a size 11 mm insert.  She is very lax to both varus valgus exam.  I sequentially sized up to a size 16.  She still had some hyperextension but was symmetric varus valgus exam with firm endpoints.  Given the thickness of her poly I elected to use augments.  I did an augment trial with 5 mm medial lateral tibial augments and a 11 mm insert and was satisfied with this provisional trialing.  Negative patella was tracking normally through the femoral groove.    I now turned my attention to the patella.  I everted the patella with 2 sharp towel clips.  I circumferentially burned around this.  I measured the thickness at 20 mm.  I took a freehand resection down to 14 mm of bone.  I sized her an asymmetric 32.  I aligned the drill guide with the medial border the patella and drilled for the polyethylene component.  I then placed the trial and debrided some of the osteophytes.  I then tested kinematics with the size 11 insert and the patella was tracking normally.  She still had some mild hyperextension.  I marked the alignment of my tibial baseplate.  I now removed all trial components.    I placed Hohmann retractors medial and laterally.  I seated the tibial trial with the augments and aligned it with my previous marks.  I pinned this into  position.  I then used a Hohmann posteriorly to translate the tibia anteriorly.  I first used the boss reaming drill.  I then drilled for the 12 x 50 cemented stem.  I then used the punch guide and the oversized punch guide.  I now removed the tibial baseplate.  I extended the knee and inspected for debridement.  There is firm palpation of the medial collateral ligament.  I now thoroughly irrigated the soft tissues and bony structures for preparation of implantation.    I position the knee in flexion with the 3 Hohmann retractors.  I thoroughly irrigated dried the tibial plateau.  The canal of the tibia was not open and a cement restrictor was not necessary.  I then used standard cementation technique and finger packed the cement into the tibial drill hole first.  I then seated on the plateau and seated the tibial component.  I remove the extra cement.  I then reduced the knee and protect the soft tissues around the distal femur.  I irrigated cleaned and dried the distal femur.  I then seated the femoral component and remove the extravasated cement.  I placed an 11 mm insert trial and held the knee into extension as a cement fully cured.  I also everted cleaned irrigated and dried the patella and seated the patellar component.  After cemented thoroughly cured I tested with the 11 and then size 13 mm insert and I was satisfied with the size 13 posterior stabilized tibial bearing insert trial.  I remove the trial component.  I irrigated the locking mechanism.  I seated the size 4 x 13 mm posterior stabilized X.3 polyethylene insert.  If it was secure medial and laterally.  I now extended the knee and tested the kinematics and was satisfied.  I proceeded to closure.    I irrigated thoroughly with diluted Betadine solution.  I then washed this out with normal saline.  I then packed the knee to obtain hemostasis.  I placed 1 intra-articular drain.  I then closed the arthrotomy with a interrupted #2 Ethibond in a  semiflexed position.  I then tested the closure through range of motion.  I then irrigated the soft tissues.  I placed 1 extra-articular drain.  I closed in a layered fashion with running 0 Vicryl, buried interrupted 2-0 Vicryl and running 4-0 Monocryl.  Dermabond and sterile dressing were applied.  Patient tolerated procedure well no complications encountered.  She is awakened taken to PACU in stable condition.      Complications:  None; patient tolerated the procedure well.    Disposition: PACU - hemodynamically stable.  Condition: stable             Attending Attestation: I performed the procedure.    Rudy Mack  Phone Number: [phone]

## 2024-04-09 NOTE — ANESTHESIA PREPROCEDURE EVALUATION
Patient: Stacey Dyer    Procedure Information       Date/Time: 04/09/24 1000    Procedure: Open Total Knee Arthroplasty (Left: Knee) - DEBBI PRESSFIT    Location: [facility] / [facility]    Surgeons: Rudy Mack MD            Relevant Problems   Anesthesia (within normal limits)      Cardiac   (+) Pure hypercholesterolemia   (-) History of coronary artery bypass graft   (-) Pacemaker      Pulmonary   (+) Asthma   (-) Chronic obstructive pulmonary disease (CMS/HCC)   (-) RJ (obstructive sleep apnea)      Neuro   (-) CVA (cerebral vascular accident) (CMS/HCC)   (-) Seizures (CMS/HCC)      Liver   (+) Fatty liver      Endocrine   (-) Diabetes mellitus, type 2 (CMS/HCC)      Hematology   (-) Coagulopathy (CMS/HCC)      Musculoskeletal   (+) Primary osteoarthritis involving multiple joints       Clinical information reviewed:   Tobacco  Allergies  Meds  Problems  Med Hx  Surg Hx  OB Status    Fam Hx  Soc Hx        NPO Detail:  NPO/Void Status  Date of Last Liquid: 04/08/24  Time of Last Liquid: 2300  Date of Last Solid: 04/08/24  Time of Last Solid: 2300  Time of Last Void: 0700         Physical Exam    Airway  Mallampati: I  TM distance: >3 FB  Neck ROM: full     Cardiovascular    Dental    Pulmonary    Abdominal            Anesthesia Plan    History of general anesthesia?: yes  History of complications of general anesthesia?: no    ASA 2     general and regional     The patient is not a current smoker.    intravenous induction   Postoperative administration of opioids is intended.  Anesthetic plan and risks discussed with patient.  Use of blood products discussed with patient who consented to blood products.

## 2024-04-09 NOTE — ANESTHESIA POSTPROCEDURE EVALUATION
Patient: Stacey yDer    Procedure Summary       Date: 04/09/24 Room / Location: [facility] / [facility]    Anesthesia Start: 1112 Anesthesia Stop: 1413    Procedure: Open Total Knee Arthroplasty (Left: Knee) Diagnosis:       Primary osteoarthritis of left knee      (LEFT KNEE OSTEOATHIRIS)    Surgeons: Rudy Mack MD Responsible Provider: Jame Cervantes MD    Anesthesia Type: general, regional ASA Status: 2            Anesthesia Type: general, regional    Vitals Value Taken Time   /72 04/09/24 1456   Temp 36.4 04/09/24 1457   Pulse 69 04/09/24 1457   Resp 9 04/09/24 1457   SpO2 96 % 04/09/24 1457   Vitals shown include unvalidated device data.    Anesthesia Post Evaluation    Patient location during evaluation: PACU  Patient participation: complete - patient participated  Level of consciousness: awake  Pain management: adequate  Multimodal analgesia pain management approach  Airway patency: patent  Cardiovascular status: acceptable and hemodynamically stable  Respiratory status: acceptable and spontaneous ventilation  Hydration status: euvolemic  Postoperative Nausea and Vomiting: none  Comments: No nausea or vomiting        No notable events documented.     Quality 226: Preventive Care And Screening: Tobacco Use: Screening And Cessation Intervention: Patient screened for tobacco use and is an ex/non-smoker Quality 402: Tobacco Use And Help With Quitting Among Adolescents: Patient screened for tobacco and never smoked Quality 431: Preventive Care And Screening: Unhealthy Alcohol Use - Screening: Patient identified as an unhealthy alcohol user when screened for unhealthy alcohol use using a systematic screening method and received brief counseling Quality 110: Preventive Care And Screening: Influenza Immunization: Influenza Immunization Administered during Influenza season Detail Level: Detailed

## 2024-04-09 NOTE — CARE PLAN
Problem: Pain  Goal: My pain/discomfort is manageable  Outcome: Progressing     Problem: Safety  Goal: Patient will be injury free during hospitalization  Outcome: Progressing  Goal: I will remain free of falls  Outcome: Progressing     Problem: Daily Care  Goal: Daily care needs are met  Outcome: Progressing     Problem: Psychosocial Needs  Goal: Demonstrates ability to cope with hospitalization/illness  Outcome: Progressing  Goal: Collaborate with me, my family, and caregiver to identify my specific goals  Outcome: Progressing  Flowsheets (Taken 4/9/2024 2056)  Cultural Requests During Hospitalization: none  Spiritual Requests During Hospitalization: none     Problem: Discharge Barriers  Goal: My discharge needs are met  Outcome: Progressing   The patient's goals for the shift include pain control    The clinical goals for the shift include pain management

## 2024-04-09 NOTE — ANESTHESIA PROCEDURE NOTES
Peripheral Block    Patient location during procedure: holding area  Start time: 4/9/2024 10:37 AM  End time: 4/9/2024 10:39 AM  Reason for block: at surgeon's request  Staffing  Performed: attending   Authorized by: Jame Cervantes MD    Performed by: Jame Cervantes MD  Preanesthetic Checklist  Completed: patient identified, IV checked, site marked, risks and benefits discussed, surgical consent, monitors and equipment checked, pre-op evaluation and timeout performed   Timeout performed at: 4/9/2024 10:30 AM  Peripheral Block  Patient position: laying flat  Prep: ChloraPrep  Patient monitoring: heart rate, cardiac monitor and continuous pulse ox  Block type: adductor canal  Laterality: left  Injection technique: single-shot  Guidance: ultrasound guided  Local infiltration: ropivacaine  Infiltration strength: 0.5 %  Dose: 20 mL  Needle  Needle type: long-bevel   Needle gauge: 20 G  Needle length: 10 cm  Needle localization: ultrasound guidance  Assessment  Injection assessment: negative aspiration for heme, no paresthesia on injection, incremental injection and local visualized surrounding nerve on ultrasound  Heart rate change: no  Slow fractionated injection: no

## 2024-04-09 NOTE — DISCHARGE INSTRUCTIONS
Dressing: remove after 1 week. Call if any drainage is coming out of the bandage.  Weight bearing: weight bear as tolerated  Showering: may shower  Bathing: no tub bathing or pools  Driving: no driving    Call with any concerns.

## 2024-04-10 ENCOUNTER — DOCUMENTATION (OUTPATIENT)
Dept: HOME HEALTH SERVICES | Facility: HOME HEALTH | Age: 59
End: 2024-04-10
Payer: COMMERCIAL

## 2024-04-10 ENCOUNTER — PHARMACY VISIT (OUTPATIENT)
Dept: PHARMACY | Facility: CLINIC | Age: 59
End: 2024-04-10
Payer: MEDICARE

## 2024-04-10 ENCOUNTER — HOME HEALTH ADMISSION (OUTPATIENT)
Dept: HOME HEALTH SERVICES | Facility: HOME HEALTH | Age: 59
End: 2024-04-10
Payer: COMMERCIAL

## 2024-04-10 VITALS
BODY MASS INDEX: 31.54 KG/M2 | HEART RATE: 76 BPM | HEIGHT: 63 IN | WEIGHT: 178 LBS | DIASTOLIC BLOOD PRESSURE: 72 MMHG | OXYGEN SATURATION: 97 % | TEMPERATURE: 97.9 F | RESPIRATION RATE: 17 BRPM | SYSTOLIC BLOOD PRESSURE: 137 MMHG

## 2024-04-10 LAB
ANION GAP SERPL CALC-SCNC: 11 MMOL/L
BUN SERPL-MCNC: 12 MG/DL (ref 8–25)
CALCIUM SERPL-MCNC: 8.4 MG/DL (ref 8.5–10.4)
CHLORIDE SERPL-SCNC: 103 MMOL/L (ref 97–107)
CO2 SERPL-SCNC: 21 MMOL/L (ref 24–31)
CREAT SERPL-MCNC: 0.7 MG/DL (ref 0.4–1.6)
EGFRCR SERPLBLD CKD-EPI 2021: >90 ML/MIN/1.73M*2
ERYTHROCYTE [DISTWIDTH] IN BLOOD BY AUTOMATED COUNT: 12.6 % (ref 11.5–14.5)
GLUCOSE SERPL-MCNC: 119 MG/DL (ref 65–99)
HCT VFR BLD AUTO: 36 % (ref 36–46)
HGB BLD-MCNC: 11.7 G/DL (ref 12–16)
IRON SATN MFR SERPL: 12 % (ref 12–50)
IRON SERPL-MCNC: 43 UG/DL (ref 30–160)
MCH RBC QN AUTO: 32.7 PG (ref 26–34)
MCHC RBC AUTO-ENTMCNC: 32.5 G/DL (ref 32–36)
MCV RBC AUTO: 101 FL (ref 80–100)
NRBC BLD-RTO: 0 /100 WBCS (ref 0–0)
PLATELET # BLD AUTO: 177 X10*3/UL (ref 150–450)
POTASSIUM SERPL-SCNC: 4.9 MMOL/L (ref 3.4–5.1)
RBC # BLD AUTO: 3.58 X10*6/UL (ref 4–5.2)
SODIUM SERPL-SCNC: 135 MMOL/L (ref 133–145)
TIBC SERPL-MCNC: 358 UG/DL (ref 228–428)
UIBC SERPL-MCNC: 315 UG/DL (ref 110–370)
WBC # BLD AUTO: 10.1 X10*3/UL (ref 4.4–11.3)

## 2024-04-10 PROCEDURE — 80048 BASIC METABOLIC PNL TOTAL CA: CPT | Performed by: ORTHOPAEDIC SURGERY

## 2024-04-10 PROCEDURE — 97535 SELF CARE MNGMENT TRAINING: CPT | Mod: GO

## 2024-04-10 PROCEDURE — 83550 IRON BINDING TEST: CPT | Performed by: NURSE PRACTITIONER

## 2024-04-10 PROCEDURE — 36415 COLL VENOUS BLD VENIPUNCTURE: CPT | Performed by: ORTHOPAEDIC SURGERY

## 2024-04-10 PROCEDURE — 99222 1ST HOSP IP/OBS MODERATE 55: CPT | Performed by: NURSE PRACTITIONER

## 2024-04-10 PROCEDURE — 2500000004 HC RX 250 GENERAL PHARMACY W/ HCPCS (ALT 636 FOR OP/ED): Performed by: NURSE PRACTITIONER

## 2024-04-10 PROCEDURE — 97110 THERAPEUTIC EXERCISES: CPT | Mod: GP,CQ

## 2024-04-10 PROCEDURE — 85027 COMPLETE CBC AUTOMATED: CPT | Performed by: ORTHOPAEDIC SURGERY

## 2024-04-10 PROCEDURE — 7100000011 HC EXTENDED STAY RECOVERY HOURLY - NURSING UNIT

## 2024-04-10 PROCEDURE — RXMED WILLOW AMBULATORY MEDICATION CHARGE

## 2024-04-10 PROCEDURE — 94640 AIRWAY INHALATION TREATMENT: CPT

## 2024-04-10 PROCEDURE — 2500000001 HC RX 250 WO HCPCS SELF ADMINISTERED DRUGS (ALT 637 FOR MEDICARE OP): Performed by: ORTHOPAEDIC SURGERY

## 2024-04-10 PROCEDURE — 97165 OT EVAL LOW COMPLEX 30 MIN: CPT | Mod: GO

## 2024-04-10 PROCEDURE — 97116 GAIT TRAINING THERAPY: CPT | Mod: GP,CQ

## 2024-04-10 PROCEDURE — 2500000001 HC RX 250 WO HCPCS SELF ADMINISTERED DRUGS (ALT 637 FOR MEDICARE OP): Performed by: NURSE PRACTITIONER

## 2024-04-10 PROCEDURE — 2500000004 HC RX 250 GENERAL PHARMACY W/ HCPCS (ALT 636 FOR OP/ED): Performed by: ORTHOPAEDIC SURGERY

## 2024-04-10 PROCEDURE — 9420000001 HC RT PATIENT EDUCATION 5 MIN

## 2024-04-10 RX ORDER — DOCUSATE SODIUM 100 MG/1
100 CAPSULE, LIQUID FILLED ORAL 2 TIMES DAILY
Qty: 30 CAPSULE | Refills: 0 | Status: SHIPPED | OUTPATIENT
Start: 2024-04-10 | End: 2024-04-25

## 2024-04-10 RX ORDER — ASPIRIN 81 MG/1
81 TABLET ORAL 2 TIMES DAILY
Qty: 56 TABLET | Refills: 0 | Status: SHIPPED | OUTPATIENT
Start: 2024-04-10 | End: 2024-05-08

## 2024-04-10 RX ORDER — IBUPROFEN 600 MG/1
600 TABLET ORAL EVERY 6 HOURS PRN
Qty: 20 TABLET | Refills: 0 | Status: SHIPPED | OUTPATIENT
Start: 2024-04-10 | End: 2024-04-15

## 2024-04-10 RX ORDER — OXYCODONE HYDROCHLORIDE 5 MG/1
5 TABLET ORAL EVERY 4 HOURS PRN
Qty: 40 TABLET | Refills: 0 | Status: SHIPPED | OUTPATIENT
Start: 2024-04-10 | End: 2024-04-17

## 2024-04-10 RX ORDER — ACETAMINOPHEN 500 MG
1000 TABLET ORAL EVERY 6 HOURS PRN
Qty: 120 TABLET | Refills: 0 | Status: SHIPPED | OUTPATIENT
Start: 2024-04-10 | End: 2024-04-25

## 2024-04-10 RX ADMIN — OXYCODONE 10 MG: 5 TABLET ORAL at 16:32

## 2024-04-10 RX ADMIN — OXYCODONE 10 MG: 5 TABLET ORAL at 11:50

## 2024-04-10 RX ADMIN — ACETAMINOPHEN 1000 MG: 500 TABLET ORAL at 05:40

## 2024-04-10 RX ADMIN — MULTIVITAMIN TABLET 1 TABLET: TABLET at 08:08

## 2024-04-10 RX ADMIN — DOCUSATE SODIUM 100 MG: 100 CAPSULE, LIQUID FILLED ORAL at 08:08

## 2024-04-10 RX ADMIN — ACETAMINOPHEN 1000 MG: 500 TABLET ORAL at 00:08

## 2024-04-10 RX ADMIN — OXYCODONE 10 MG: 5 TABLET ORAL at 08:08

## 2024-04-10 RX ADMIN — KETOROLAC TROMETHAMINE 15 MG: 30 INJECTION INTRAMUSCULAR; INTRAVENOUS at 08:25

## 2024-04-10 RX ADMIN — CEFAZOLIN SODIUM 2 G: 2 INJECTION, SOLUTION INTRAVENOUS at 02:00

## 2024-04-10 RX ADMIN — ASPIRIN 81 MG: 81 TABLET, COATED ORAL at 08:08

## 2024-04-10 RX ADMIN — IRON SUCROSE 100 MG: 20 INJECTION, SOLUTION INTRAVENOUS at 11:42

## 2024-04-10 RX ADMIN — CHOLESTYRAMINE 4 G: 4 POWDER, FOR SUSPENSION ORAL at 08:08

## 2024-04-10 RX ADMIN — OXYCODONE 10 MG: 5 TABLET ORAL at 04:03

## 2024-04-10 RX ADMIN — FLUTICASONE FUROATE AND VILANTEROL TRIFENATATE 1 PUFF: 200; 25 POWDER RESPIRATORY (INHALATION) at 07:07

## 2024-04-10 RX ADMIN — KETOROLAC TROMETHAMINE 15 MG: 30 INJECTION INTRAMUSCULAR; INTRAVENOUS at 00:08

## 2024-04-10 RX ADMIN — ACETAMINOPHEN 1000 MG: 500 TABLET ORAL at 17:36

## 2024-04-10 RX ADMIN — POLYETHYLENE GLYCOL 3350 17 G: 17 POWDER, FOR SOLUTION ORAL at 08:08

## 2024-04-10 RX ADMIN — KETOROLAC TROMETHAMINE 15 MG: 30 INJECTION INTRAMUSCULAR; INTRAVENOUS at 15:03

## 2024-04-10 RX ADMIN — ACETAMINOPHEN 1000 MG: 500 TABLET ORAL at 11:43

## 2024-04-10 ASSESSMENT — PAIN - FUNCTIONAL ASSESSMENT
PAIN_FUNCTIONAL_ASSESSMENT: 0-10

## 2024-04-10 ASSESSMENT — PAIN SCALES - GENERAL
PAINLEVEL_OUTOF10: 7
PAINLEVEL_OUTOF10: 3
PAINLEVEL_OUTOF10: 9
PAINLEVEL_OUTOF10: 5 - MODERATE PAIN
PAINLEVEL_OUTOF10: 6
PAINLEVEL_OUTOF10: 4
PAINLEVEL_OUTOF10: 8
PAINLEVEL_OUTOF10: 7
PAINLEVEL_OUTOF10: 5 - MODERATE PAIN
PAINLEVEL_OUTOF10: 6
PAINLEVEL_OUTOF10: 5 - MODERATE PAIN
PAINLEVEL_OUTOF10: 7

## 2024-04-10 ASSESSMENT — PAIN DESCRIPTION - ORIENTATION
ORIENTATION: LEFT
ORIENTATION: LEFT

## 2024-04-10 ASSESSMENT — COGNITIVE AND FUNCTIONAL STATUS - GENERAL
WALKING IN HOSPITAL ROOM: A LITTLE
HELP NEEDED FOR BATHING: A LITTLE
WALKING IN HOSPITAL ROOM: A LITTLE
TURNING FROM BACK TO SIDE WHILE IN FLAT BAD: A LITTLE
STANDING UP FROM CHAIR USING ARMS: A LITTLE
CLIMB 3 TO 5 STEPS WITH RAILING: A LITTLE
MOBILITY SCORE: 18
TURNING FROM BACK TO SIDE WHILE IN FLAT BAD: A LITTLE
PERSONAL GROOMING: A LITTLE
DAILY ACTIVITIY SCORE: 19
MOVING FROM LYING ON BACK TO SITTING ON SIDE OF FLAT BED WITH BEDRAILS: A LITTLE
MOBILITY SCORE: 18
DRESSING REGULAR LOWER BODY CLOTHING: A LITTLE
DRESSING REGULAR UPPER BODY CLOTHING: A LITTLE
CLIMB 3 TO 5 STEPS WITH RAILING: A LITTLE
TOILETING: A LITTLE
MOVING TO AND FROM BED TO CHAIR: A LITTLE
MOVING FROM LYING ON BACK TO SITTING ON SIDE OF FLAT BED WITH BEDRAILS: A LITTLE
STANDING UP FROM CHAIR USING ARMS: A LITTLE
MOVING TO AND FROM BED TO CHAIR: A LITTLE

## 2024-04-10 ASSESSMENT — ENCOUNTER SYMPTOMS
BLOOD IN STOOL: 0
NAUSEA: 0
PALPITATIONS: 0
COUGH: 0
ACTIVITY CHANGE: 1
NUMBNESS: 0
CONSTIPATION: 0
FEVER: 0
VOMITING: 0
AGITATION: 0
WEAKNESS: 0
DIARRHEA: 1
SHORTNESS OF BREATH: 0
ABDOMINAL DISTENTION: 0
ARTHRALGIAS: 1
CONFUSION: 0
HEADACHES: 0
WHEEZING: 0
BRUISES/BLEEDS EASILY: 0
COLOR CHANGE: 0
CHILLS: 0
LIGHT-HEADEDNESS: 0
FATIGUE: 0
DIZZINESS: 0
WOUND: 0
HEMATURIA: 0

## 2024-04-10 ASSESSMENT — ACTIVITIES OF DAILY LIVING (ADL)
ADL_ASSISTANCE: INDEPENDENT
HOME_MANAGEMENT_TIME_ENTRY: 27
BATHING_ASSISTANCE: MINIMAL

## 2024-04-10 ASSESSMENT — PAIN DESCRIPTION - LOCATION
LOCATION: KNEE
LOCATION: KNEE

## 2024-04-10 NOTE — PROGRESS NOTES
Stacey Dyer is a 59 y.o. female on day 0 of admission presenting with Arthritis of left knee.      Subjective   Patient resting in bed, having pain after therapy and drain pulled.        Objective     Last Recorded Vitals  /73 (BP Location: Left arm, Patient Position: Lying)   Pulse 57   Temp 36.6 °C (97.9 °F) (Temporal)   Resp 17   Wt 80.7 kg (178 lb)   SpO2 95%   Intake/Output last 3 Shifts:    Intake/Output Summary (Last 24 hours) at 4/10/2024 1032  Last data filed at 4/10/2024 0950  Gross per 24 hour   Intake 4564.99 ml   Output 1945 ml   Net 2619.99 ml       Admission Weight  Weight: 80.7 kg (178 lb) (04/09/24 0820)    Daily Weight  04/09/24 : 80.7 kg (178 lb)    Image Results  XR knee left 1-2 views  Narrative: Interpreted By:  Israel Almaraz,   STUDY:  XR KNEE LEFT 1-2 VIEWS; 4/9/2024 2:51 pm      INDICATION:  Signs/Symptoms:Post-op knee.      COMPARISON:  07/21/2023      ACCESSION NUMBER(S):  [id number]      ORDERING CLINICIAN:  CARLENE NATHAN      TECHNIQUE:  AP and lateral views of the left knee.      FINDINGS:  Expected postoperative changes of left total knee prosthesis. Normal  appearance of femoral, tibial, and retropatellar components. No  abnormal radiopaque foreign body. Expected postoperative changes of  the soft tissues.      Impression: Expected postoperative changes of left total knee prosthesis.      Signed by: Israel Almaraz 4/9/2024 3:48 PM  Dictation workstation:   [id number]      Physical Exam  Constitutional:       Appearance: Normal appearance.   Cardiovascular:      Rate and Rhythm: Normal rate and regular rhythm.      Pulses: Normal pulses.      Heart sounds: Normal heart sounds.   Pulmonary:      Effort: Pulmonary effort is normal.      Breath sounds: Normal breath sounds.   Abdominal:      General: Bowel sounds are normal.      Palpations: Abdomen is soft.   Musculoskeletal:         General: Normal range of motion.   Skin:     General: Skin is warm and dry.    Neurological:      Mental Status: She is alert and oriented to person, place, and time.         Relevant Results             Results for orders placed or performed during the hospital encounter of 04/09/24 (from the past 24 hour(s))   CBC   Result Value Ref Range    WBC 10.1 4.4 - 11.3 x10*3/uL    nRBC 0.0 0.0 - 0.0 /100 WBCs    RBC 3.58 (L) 4.00 - 5.20 x10*6/uL    Hemoglobin 11.7 (L) 12.0 - 16.0 g/dL    Hematocrit 36.0 36.0 - 46.0 %     (H) 80 - 100 fL    MCH 32.7 26.0 - 34.0 pg    MCHC 32.5 32.0 - 36.0 g/dL    RDW 12.6 11.5 - 14.5 %    Platelets 177 150 - 450 x10*3/uL   Basic metabolic panel   Result Value Ref Range    Glucose 119 (H) 65 - 99 mg/dL    Sodium 135 133 - 145 mmol/L    Potassium 4.9 3.4 - 5.1 mmol/L    Chloride 103 97 - 107 mmol/L    Bicarbonate 21 (L) 24 - 31 mmol/L    Urea Nitrogen 12 8 - 25 mg/dL    Creatinine 0.70 0.40 - 1.60 mg/dL    eGFR >90 >60 mL/min/1.73m*2    Calcium 8.4 (L) 8.5 - 10.4 mg/dL    Anion Gap 11 <=19 mmol/L       Assessment/Plan          This patient has a urinary catheter   Reason for the urinary catheter remaining today? Removed today          Principal Problem:    Arthritis of left knee  Active Problems:    Asthma    Pure hypercholesterolemia      Arthritis of left knee              S/P total left knee arthroplasty 4/9              DVT prophylaxis per primary service               Pain management per primary service               PT/OT               Bowel regimen                   Asthma  Breo in place of home Advair while admitted   Respiratory consult   IS   Wean O2 as tolerated   PRN Duonebs       Pure hypercholesterolemia              Continue home cholestyramine dose       DVT prophylaxis               ASA 81 mg twice daily   Plan of Care  Discharge today per Dr. Mack with [facility]  Plan of care discussed with patient and collaborating physician, Dr. Duran.             Teresa Ramon, APRN-CNP

## 2024-04-10 NOTE — DISCHARGE SUMMARY
Discharge Diagnosis  Arthritis of left knee    Issues Requiring Follow-Up  Left total knee replacement    Test Results Pending At Discharge  Pending Labs       No current pending labs.            Hospital Course   Patient brought to the hospital day of admission for left total knee replacement.  Uncomplicated surgery admitted to regular nursing floor.  Plan for home with home health care.    Pertinent Physical Exam At Time of Discharge  Physical Exam    Home Medications     Medication List      START taking these medications     aspirin 81 mg EC tablet; Take 1 tablet (81 mg) by mouth 2 times a day   for 28 days.   docusate sodium 100 mg capsule; Commonly known as: Colace; Take 1   capsule (100 mg) by mouth 2 times a day for 15 days.   oxyCODONE 5 mg immediate release tablet; Commonly known as: Roxicodone;   Take 1 tablet (5 mg) by mouth every 4 hours if needed for severe pain (7 -   10) for up to 7 days.     CHANGE how you take these medications     * acetaminophen 325 mg tablet; Commonly known as: Tylenol; What changed:   Another medication with the same name was added. Make sure you understand   how and when to take each.   * acetaminophen 500 mg tablet; Commonly known as: Tylenol; Take 2   tablets (1,000 mg) by mouth every 6 hours if needed for mild pain (1 - 3)   for up to 15 days.; What changed: You were already taking a medication   with the same name, and this prescription was added. Make sure you   understand how and when to take each.   * ibuprofen 200 mg tablet; What changed: Another medication with the   same name was added. Make sure you understand how and when to take each.   * ibuprofen 600 mg tablet; Take 1 tablet (600 mg) by mouth every 6 hours   if needed for mild pain (1 - 3) for up to 5 days.; What changed: You were   already taking a medication with the same name, and this prescription was   added. Make sure you understand how and when to take each.  * This list has 4 medication(s) that are the  same as other medications   prescribed for you. Read the directions carefully, and ask your doctor or   other care provider to review them with you.     CONTINUE taking these medications     Advair Diskus 250-50 mcg/dose diskus inhaler; Generic drug: fluticasone   propion-salmeteroL; INHALE 1 PUFF BY MOUTH TWICE A DAY FOR 90 DAYS *RINSE   MOUTH AFTER USE*   albuterol 90 mcg/actuation inhaler; INHALE 2 PUFFS BY MOUTH EVERY 4   HOURS AS NEEDED FOR 90 DAYS   cholestyramine light 4 gram packet; Commonly known as: Prevalite; USE 1   PACKET 1 TO 2 TIMES A DAY ORALLY AS DIRECTED FOR 90 DAYS TOTAL 30   EpiPen 2-Thomas 0.3 mg/0.3 mL injection syringe; Generic drug: EPINEPHrine   Sudafed 30 mg tablet; Generic drug: pseudoephedrine     STOP taking these medications     chlorhexidine 0.12 % solution; Commonly known as: Peridex       Outpatient Follow-Up  Future Appointments   Date Time Provider Department Center   7/26/2024 10:00 AM Rd Muhammad MD [id number] Renny Mack MD

## 2024-04-10 NOTE — PROGRESS NOTES
Physical Therapy    Physical Therapy Treatment    Patient Name: Stacey Dyer  MRN: [id number]  Today's Date: 4/10/2024  Time Calculation  Start Time: 1303  Stop Time: 1342  Time Calculation (min): 39 min       Assessment/Plan   PT Assessment  Rehab Prognosis: Good  Evaluation/Treatment Tolerance: Patient tolerated treatment well  Medical Staff Made Aware: Yes  End of Session Communication: Bedside nurse  End of Session Patient Position: Bed, 3 rail up, Alarm on     PT Plan  Treatment/Interventions: Bed mobility, Transfer training, Gait training, Therapeutic exercise, Home exercise program  PT Plan: Skilled PT  PT Frequency: BID  PT Discharge Recommendations: Low intensity level of continued care  Equipment Recommended upon Discharge: Wheeled walker  PT Recommended Transfer Status: Assistive device, Stand by assist  PT - OK to Discharge: Yes      General Visit Information:   PT  Visit  PT Received On: 04/10/24  General  Prior to Session Communication: Bedside nurse  Patient Position Received: Bed, 3 rail up, Alarm on  Preferred Learning Style: verbal  General Comment: Pt agreeable to therapy    Subjective   Precautions:  Precautions  Hearing/Visual Limitations: reading glasses  LE Weight Bearing Status: Weight Bearing as Tolerated  Medical Precautions: Fall precautions  Post-Surgical Precautions: Left total knee precautions  Vital Signs:       Objective   Pain:  Pain Assessment  Pain Assessment: 0-10  Pain Score: 6  Pain Type: Surgical pain  Pain Location: Knee  Pain Orientation: Left  Pain Interventions: Cold pack  Cognition:  Cognition  Overall Cognitive Status: Within Functional Limits  Orientation Level: Oriented X4  Postural Control:     Extremity/Trunk Assessments:    Activity Tolerance:     Treatments:  Therapeutic Exercise  Therapeutic Exercise Performed: Yes  Therapeutic Exercise Activity 1: Pt performed seated bilat LE ankle pumps/heel raises, glute sets, LAQ, hip flexion, hazel hip adduction and resisted  hip abduction x15 reps each. Left LE seated heel slides x15.    Bed Mobility 1  Bed Mobility 1: Supine to sitting  Level of Assistance 1: Close supervision  Bed Mobility 2  Bed Mobility  2: Sitting to supine  Level of Assistance 2: Close supervision    Ambulation/Gait Training 1  Surface 1: Level tile  Device 1: Rolling walker  Assistance 1: Close supervision  Comments/Distance (ft) 1: Pt ambulated with RW ~15' x2 and ~200' x2 close supervision. Pt able to ambulate with reciprocal gait pattern, decreased bilat step length.  Transfer 1  Transfer From 1: Bed to  Transfer to 1: Stand  Technique 1: Sit to stand  Transfer Device 1: Walker  Transfer Level of Assistance 1: Close supervision  Trials/Comments 1: Verbal cues to push from bed sit to stand.  Transfers 2  Transfer From 2: Stand to, Sit to  Transfer to 2: Chair with arms  Technique 2: Stand to sit, Sit to stand  Transfer Level of Assistance 2: Close supervision  Trials/Comments 2: Pt ambulated ~15' x2 with RW and close supervision  Transfers 3  Transfer From 3: Stand to  Transfer to 3: Sit, Toilet  Technique 3: Stand to sit, Sit to stand  Transfer Level of Assistance 3: Close supervision, Minimal verbal cues  Trials/Comments 3: Verbal cues for fully backing up to toilet and to use sink/grab bars to sit/stand  Transfers 4  Transfer From 4: Stand to  Transfer to 4: Sit, Bed  Technique 4: Stand to sit  Transfer Level of Assistance 4: Close supervision    Outcome Measures:  [facility] Basic Mobility  Turning from your back to your side while in a flat bed without using bedrails: A little  Moving from lying on your back to sitting on the side of a flat bed without using bedrails: A little  Moving to and from bed to chair (including a wheelchair): A little  Standing up from a chair using your arms (e.g. wheelchair or bedside chair): A little  To walk in hospital room: A little  Climbing 3-5 steps with railing: A little  Basic Mobility - Total Score: 18    Education  Documentation  Handouts, taught by Ann Marie Oliveira PTA at 4/10/2024  2:42 PM.  Learner: Patient  Readiness: Acceptance  Method: Explanation  Response: Verbalizes Understanding    Precautions, taught by Ann Marie Oliveira PTA at 4/10/2024  2:42 PM.  Learner: Patient  Readiness: Acceptance  Method: Explanation  Response: Verbalizes Understanding    Body Mechanics, taught by Ann Marie Oliveira PTA at 4/10/2024  2:42 PM.  Learner: Patient  Readiness: Acceptance  Method: Explanation  Response: Verbalizes Understanding    Home Exercise Program, taught by Ann Marie Oliveira PTA at 4/10/2024  2:42 PM.  Learner: Patient  Readiness: Acceptance  Method: Explanation  Response: Verbalizes Understanding    Mobility Training, taught by Ann Marie Oliveira PTA at 4/10/2024  2:42 PM.  Learner: Patient  Readiness: Acceptance  Method: Explanation  Response: Verbalizes Understanding    Education Comments  No comments found.        OP EDUCATION:  Outpatient Education  Individual(s) Educated: Patient  Education Provided: Home Exercise Program, Post-Op Precautions  Equipment: Thera-Band    Encounter Problems       Encounter Problems (Active)       Mobility       STG - Patient will ambulate 100' w/ supervision and RW (Progressing)       Start:  04/09/24    Expected End:  04/10/24            STG - Patient will ambulate up and down a curb/step w/ min assist and RW (Progressing)       Start:  04/09/24    Expected End:  04/10/24               Mobility       Pt will demonstrate understanding of post op TKA HEP.  (Progressing)       Start:  04/09/24    Expected End:  04/10/24               PT Transfers       STG - Patient to transfer to and from sit to supine IND (Progressing)       Start:  04/09/24    Expected End:  04/10/24            STG - Patient will transfer sit to and from stand MOD I (Progressing)       Start:  04/09/24    Expected End:  04/10/24

## 2024-04-10 NOTE — PROGRESS NOTES
04/10/24 0954   Discharge Planning   Living Arrangements Spouse/significant other   Support Systems Spouse/significant other   Assistance Needed independent   Type of Residence Private residence   Number of Stairs to Enter Residence 1   Number of Stairs Within Residence 0  (alll needs on first floor)   Do you have animals or pets at home? Yes   Type of Animals or Pets dog   Who is requesting discharge planning? Patient   Home or Post Acute Services In home services   Type of Home Care Services Home PT   Patient expects to be discharged to: Home with [facility]   Does the patient need discharge transport arranged? No       Home with [facility] after 2nd therapy.   SOC -4/10

## 2024-04-10 NOTE — CONSULTS
Consults-blood management    Reason For Consult  Post op anemia    History Of Present Illness  Stacey Dyer is a 59 y.o. female presenting with OA left knee. She is 1day s/p left TKA. Hemovac drain removed this morning, total hemovac drainage 145ml. Pt reprrots bilat knee pain for years, left knee pain greather than right, pain seveer for past 2years and constant pain over past year, waking her at night.  Preop H/H 14.7/43, post op H/H 11.7/36, iron pnael pending.     Past Medical History  She has a past medical history of Arthropathy (2024), Asthma, Carcinoid tumor (2023), DUB (dysfunctional uterine bleeding) (2011), Fatty liver (2023), Primary osteoarthritis involving multiple joints (2024), Pure hypercholesterolemia (2023), Right thyroid nodule (2018), Status post partial colectomy (2018), Temporomandibular joint syndrome (2023), and Thyroid nodule.    Surgical History  Appendectomy cholecystectomy, partial colon resection      Social History  She reports that she quit smoking about 24 years ago. Her smoking use included cigarettes. She started smoking about 34 years ago. She has never used smokeless tobacco. She reports that she does not currently use alcohol. She reports that she does not use drugs.    Family History  Mother  age 63, colon cancer, father alive age 88, CAD, CABG, pacemaker, dementia      Allergies  Bee venom protein (honey bee) and Azithromycin    Review of Systems   Constitutional:  Positive for activity change. Negative for chills, fatigue and fever.   HENT:  Negative for congestion and nosebleeds.    Eyes:  Negative for visual disturbance.   Respiratory:  Negative for cough, shortness of breath and wheezing.    Cardiovascular:  Negative for chest pain, palpitations and leg swelling.   Gastrointestinal:  Positive for diarrhea. Negative for abdominal distention, blood in stool, constipation, nausea and vomiting.        Chronic  Loose stools s/p  partial  colon resection,    Endocrine: Negative for cold intolerance and heat intolerance.   Genitourinary:  Negative for dyspareunia and hematuria.   Musculoskeletal:  Positive for arthralgias.   Skin:  Negative for color change, pallor, rash and wound.   Neurological:  Negative for dizziness, weakness, light-headedness, numbness and headaches.   Hematological:  Does not bruise/bleed easily.   Psychiatric/Behavioral:  Negative for agitation and confusion.         Physical Exam  HENT:      Head: Normocephalic and atraumatic.      Right Ear: External ear normal.      Left Ear: External ear normal.      Nose: Nose normal.      Mouth/Throat:      Pharynx: Oropharynx is clear.   Eyes:      Conjunctiva/sclera: Conjunctivae normal.      Pupils: Pupils are equal, round, and reactive to light.   Cardiovascular:      Rate and Rhythm: Normal rate and regular rhythm.      Heart sounds: Normal heart sounds. No murmur heard.  Pulmonary:      Effort: Pulmonary effort is normal. No respiratory distress.      Breath sounds: Normal breath sounds. No wheezing, rhonchi or rales.   Abdominal:      General: Bowel sounds are normal. There is no distension.      Palpations: Abdomen is soft.      Tenderness: There is no abdominal tenderness.   Musculoskeletal:         General: Tenderness (left knee) present.      Cervical back: Normal range of motion and neck supple.      Right lower leg: No edema.      Left lower leg: No edema.   Skin:     General: Skin is warm and dry.      Capillary Refill: Capillary refill takes 2 to 3 seconds.      Coloration: Skin is not pale.      Findings: No bruising, erythema, lesion or rash.      Comments: Ace wrap left leg dry/intact, ice to knee   Neurological:      General: No focal deficit present.      Mental Status: She is alert and oriented to person, place, and time. Mental status is at baseline.   Psychiatric:         Mood and Affect: Mood normal.         Behavior: Behavior normal.     "     Thought Content: Thought content normal.         Judgment: Judgment normal.          Last Recorded Vitals  Blood pressure 124/73, pulse 57, temperature 36.6 °C (97.9 °F), temperature source Temporal, resp. rate 17, height 1.6 m (5' 3\"), weight 80.7 kg (178 lb), SpO2 95%.    Relevant Results  Results for orders placed or performed during the hospital encounter of 04/09/24 (from the past 24 hour(s))   CBC   Result Value Ref Range    WBC 10.1 4.4 - 11.3 x10*3/uL    nRBC 0.0 0.0 - 0.0 /100 WBCs    RBC 3.58 (L) 4.00 - 5.20 x10*6/uL    Hemoglobin 11.7 (L) 12.0 - 16.0 g/dL    Hematocrit 36.0 36.0 - 46.0 %     (H) 80 - 100 fL    MCH 32.7 26.0 - 34.0 pg    MCHC 32.5 32.0 - 36.0 g/dL    RDW 12.6 11.5 - 14.5 %    Platelets 177 150 - 450 x10*3/uL   Basic metabolic panel   Result Value Ref Range    Glucose 119 (H) 65 - 99 mg/dL    Sodium 135 133 - 145 mmol/L    Potassium 4.9 3.4 - 5.1 mmol/L    Chloride 103 97 - 107 mmol/L    Bicarbonate 21 (L) 24 - 31 mmol/L    Urea Nitrogen 12 8 - 25 mg/dL    Creatinine 0.70 0.40 - 1.60 mg/dL    eGFR >90 >60 mL/min/1.73m*2    Calcium 8.4 (L) 8.5 - 10.4 mg/dL    Anion Gap 11 <=19 mmol/L         Assessment/Plan   OA left knee  S/p left TKA  Post op normocytic/borderline macrocytic anemia  Anemia of acute blood loss  Venofer 100mg IV X1  Monitor H/H            "

## 2024-04-10 NOTE — PROGRESS NOTES
Spiritual Care Visit    Clinical Encounter Type  Visited With: Patient and family together  Routine Visit: Introduction  Continue Visiting: No         Values/Beliefs  Spiritual Requests During Hospitalization: Rock only. No Sacraments needed    Sacramental Encounters  Communion: Does not want communion  Communion Given Indicator: No  Sacrament of Sick-Anointing: Patient declined anointing     Reyes Thomas

## 2024-04-10 NOTE — PROGRESS NOTES
Occupational Therapy    Evaluation/Treatment    Patient Name: Stacey Dyer  MRN: [id number]  : 1965  Today's Date: 04/10/24  Time Calculation  Start Time: 1101  Stop Time: 1143  Time Calculation (min): 42 min       Assessment:  OT Assessment: 58 yo female s/p elective left TKA presents below baseline functional status d/t pain and surgical precautions restricting mobility as well as impaired activity tolerance and generalized weakness postop.  OT to address deficits by providing ADL retraining utilizing compensatory techniques and progressive strengthening in order to maximize functional capacity.  Prognosis: Excellent  Barriers to Discharge: None  Evaluation/Treatment Tolerance: Patient limited by fatigue, Patient limited by pain  Medical Staff Made Aware: Yes  End of Session Communication: Bedside nurse  End of Session Patient Position: Bed, 3 rail up, Alarm on  OT Assessment Results: Decreased ADL status, Decreased endurance, Decreased functional mobility, Decreased IADLs  Prognosis: Excellent  Barriers to Discharge: None  Evaluation/Treatment Tolerance: Patient limited by fatigue, Patient limited by pain  Medical Staff Made Aware: Yes  Strengths: Ability to acquire knowledge, Premorbid level of function    Plan:  Treatment Interventions: ADL retraining, UE strengthening/ROM, Functional transfer training, Endurance training, Patient/family training, Equipment evaluation/education, Compensatory technique education  OT Frequency: 5 times per week  OT Discharge Recommendations: Low intensity level of continued care  Equipment Recommended upon Discharge: Wheeled walker  OT Recommended Transfer Status: Assist of 1  OT - OK to Discharge: Yes  Treatment Interventions: ADL retraining, UE strengthening/ROM, Functional transfer training, Endurance training, Patient/family training, Equipment evaluation/education, Compensatory technique education        Subjective     Current Problem:  1. Arthritis of left knee   Walker rolling    aspirin 81 mg EC tablet    docusate sodium (Colace) 100 mg capsule    acetaminophen (Tylenol) 500 mg tablet    oxyCODONE (Roxicodone) 5 mg immediate release tablet    ibuprofen 600 mg tablet    Referral to Home Health      2. Primary osteoarthritis involving multiple joints          General:   OT Received On: 04/10/24  General  Reason for Referral: impaired ADLs s/p surgery  Referred By: Dr Mack  Past Medical History Relevant to Rehab: OA, asthma, carcinoid tumor s/p partial colectomy  Family/Caregiver Present: No  Prior to Session Communication: Bedside nurse  Patient Position Received: Bed, 3 rail up, Alarm on  Preferred Learning Style: verbal, visual  General Comment: 60 yo female s/p left TKA and POD#1 was cleared by nursing for therapy and agreeable to same.    Precautions:  Hearing/Visual Limitations: reading glasses  LE Weight Bearing Status: Weight Bearing as Tolerated  Medical Precautions: Fall precautions  Post-Surgical Precautions: Left total knee precautions    Pain:  Pain Assessment  Pain Assessment: 0-10  Pain Score: 6  Pain Type: Surgical pain  Pain Location: Knee  Pain Orientation: Left  Pain Interventions: Repositioned        Objective     Cognition:  Overall Cognitive Status: Within Functional Limits  Orientation Level: Oriented X4     Home Living:  Type of Home: House  Lives With: Spouse  Home Adaptive Equipment: Walker rolling or standard, Cane, Long-handled shoehorn  Home Layout: Two level  Home Access: Stairs to enter without rails  Entrance Stairs-Rails: None  Bathroom Shower/Tub: Walk-in shower  Bathroom Toilet: Standard  Bathroom Equipment: Built-in shower seat  Bathroom Accessibility: bed and bath on main    Prior Function:  Level of [place]: Independent with ADLs and functional transfers, Independent with homemaking with ambulation  Receives Help From:  (Spouse assists with IADLs (but patient usually does most of it))  ADL Assistance: Independent  Homemaking  Assistance: Independent  Ambulatory Assistance: Independent  Vocational:  (Does not work)  Hand Dominance: Right  Prior Function Comments: +drives    ADL:  Eating Assistance: Independent (per clinical judgement)  Grooming Assistance: Stand by  Grooming Deficit: Supervision/safety (to wash hands standing at sink)  Bathing Assistance: Minimal  Bathing Deficit: Left lower leg including foot (per clinical judgement)  UE Dressing Assistance: Stand by  UE Dressing Deficit: Setup (per clinical judgement)  LE Dressing Assistance: Minimal  LE Dressing Deficit: Don/doff L shoe (donned underwear, donned/doffed socks with setup/supervision assist using adaptive aides)  Toileting Assistance with Device: Stand by  Toileting Deficit: Supervison/safety, Verbal cueing    Activity Tolerance:  Endurance: Decreased tolerance for upright activites    Bed Mobility/Transfers: Bed Mobility  Bed Mobility: Yes  Bed Mobility 1  Bed Mobility 1: Supine to sitting  Level of Assistance 1: Close supervision  Bed Mobility Comments 1: toward the right side of bed with head elevated ~40 degrees  Bed Mobility 2  Bed Mobility  2: Sitting to supine  Level of Assistance 2: Close supervision    Transfers  Transfer: Yes  Transfer 1  Transfer From 1: Bed to  Transfer to 1: Stand  Technique 1: Sit to stand, Stand to sit  Transfer Device 1: Walker  Transfer Level of Assistance 1: Close supervision  Trials/Comments 1: verbal cues for hand placement  Transfers 2  Transfer From 2: Chair with arms to  Transfer to 2: Stand  Technique 2: Sit to stand, Stand to sit  Transfer Device 2: Walker  Transfer Level of Assistance 2: Close supervision  Trials/Comments 2: verbal cues for hand and leg placement  Transfers 3  Transfer From 3: Commode-standard to  Transfer to 3: Stand  Technique 3: Sit to stand, Stand to sit  Transfer Device 3:  (grab bar)  Transfer Level of Assistance 3: Close supervision  Trials/Comments 3: verbal cues for hand leg placement    Functional  Mobility:  Functional Mobility  Functional Mobility Performed: Yes (Close supervision bed to/from bathroom with a 2 wheeled walker with cues for sequencing and positioning safely within walker frame.)    Sensation:  Light Touch: No apparent deficits  Sensation Comment: Denies tingling/numbness    Strength:  Strength Comments: BUEs = 4+/5 grossly    Hand Function:  Hand Function  Gross Grasp: Functional  Coordination: Functional    Extremities: RUE   RUE : Within Functional Limits and LUE   LUE: Within Functional Limits    Outcome Measures: [facility] Daily Activity  Putting on and taking off regular lower body clothing: A little  Bathing (including washing, rinsing, drying): A little  Putting on and taking off regular upper body clothing: A little  Toileting, which includes using toilet, bedpan or urinal: A little  Taking care of personal grooming such as brushing teeth: A little  Eating Meals: None  Daily Activity - Total Score: 19    OP EDUCATION:  Education  Individual(s) Educated: Patient  Education Provided: Fall precautons, Risk and benefits of OT discussed with patient or other, POC discussed and agreed upon  Risk and Benefits Discussed with Patient/Caregiver/Other: yes  Patient/Caregiver Demonstrated Understanding: yes  Plan of Care Discussed and Agreed Upon: yes  Patient Response to Education: Patient/Caregiver Verbalized Understanding of Information  Education Comment: Also instructed in use of adaptive aides for LE ADLs, car transfer technique, home safety for fall prevention, and walker safety for item retrieval/transport.    Goals:  Encounter Problems       Encounter Problems (Active)       OT Goals       ADLs (Progressing)       Start:  04/10/24    Expected End:  04/17/24       Pt will complete bathing, dressing, and toileting tasks independently using adaptive aides and with increased time as needed.         Functional transfers (Progressing)       Start:  04/10/24    Expected End:  04/17/24       Pt will  complete toilet, bed, and chair transfers independently from elevated seat heights and using bilateral arm supports.         Precautions (Progressing)       Start:  04/10/24    Expected End:  04/17/24       Pt will verbalize/demonstrate understanding of TKA precautions.         Activity tolerance (Progressing)       Start:  04/10/24    Expected End:  04/17/24       Pt will tolerate 20 minutes of therapeutic activity in order to increase functional endurance needed for I/ADLs.

## 2024-04-10 NOTE — CARE PLAN
The patient's goals for the shift include pain control    The clinical goals for the shift include manage pain, wean off oxygen      Problem: Pain  Goal: My pain/discomfort is manageable  Outcome: Progressing     Problem: Safety  Goal: Patient will be injury free during hospitalization  Outcome: Progressing  Goal: I will remain free of falls  Outcome: Progressing     Problem: Daily Care  Goal: Daily care needs are met  Outcome: Progressing     Problem: Psychosocial Needs  Goal: Demonstrates ability to cope with hospitalization/illness  Outcome: Progressing  Goal: Collaborate with me, my family, and caregiver to identify my specific goals  Outcome: Progressing

## 2024-04-10 NOTE — PROGRESS NOTES
Physical Therapy    Physical Therapy Treatment    Patient Name: Stacey Dyer  MRN: [id number]  Today's Date: 4/10/2024  Time Calculation  Start Time: 0812  Stop Time: 0909  Time Calculation (min): 57 min       Assessment/Plan   PT Assessment  Rehab Prognosis: Good  Evaluation/Treatment Tolerance: Patient tolerated treatment well  Medical Staff Made Aware: Yes  End of Session Communication: Bedside nurse  End of Session Patient Position: Up in chair, Alarm on     PT Plan  Treatment/Interventions: Bed mobility, Transfer training, Gait training, Stair training, Therapeutic exercise, Home exercise program  PT Plan: Skilled PT  PT Frequency: BID  PT Discharge Recommendations: Low intensity level of continued care  Equipment Recommended upon Discharge: Wheeled walker (Pt was fit with and issued RW for home. Pt educated on safety and use, handout given.)  PT Recommended Transfer Status: Stand by assist, Assistive device  PT - OK to Discharge: Yes      General Visit Information:   PT  Visit  PT Received On: 04/10/24  General  Prior to Session Communication: Bedside nurse  Patient Position Received: Bed, 3 rail up, Alarm on  Preferred Learning Style: verbal  General Comment: Pt agreeable to therapy.    Subjective   Precautions:  Precautions  Hearing/Visual Limitations: reading glasses  LE Weight Bearing Status: Weight Bearing as Tolerated  Medical Precautions: Fall precautions  Post-Surgical Precautions: Left total knee precautions  Vital Signs:       Objective   Pain:  Pain Assessment  Pain Assessment: 0-10  Pain Score: 7  Pain Type: Surgical pain  Pain Location: Knee  Pain Orientation: Left  Pain Interventions: Cold pack  Cognition:  Cognition  Overall Cognitive Status: Within Functional Limits  Orientation Level: Oriented X4  Postural Control:     Extremity/Trunk Assessments:    Activity Tolerance:     Treatments:  Therapeutic Exercise  Therapeutic Exercise Performed: Yes  Therapeutic Exercise Activity 1: Pt performed  supine bilat LE ankle pumps, quad sets, glute sets, heel slides, hip abduction slides, SAQ, SLR x15 reps each.    Bed Mobility 1  Bed Mobility 1: Supine to sitting  Level of Assistance 1: Close supervision    Ambulation/Gait Training 1  Surface 1: Level tile  Device 1: Rolling walker  Assistance 1: Close supervision  Comments/Distance (ft) 1: Pt ambulated with RW ~200' x2 close supervision. Pt initially demonstrating step to gait pattern, needed verbal cues for sequencing. Pt was able to progress to ambulating with reciprocal gait pattern with verbal cues and distance. Good quad control left. Verbal cues for left knee flexion during preswing.  Transfer 1  Transfer From 1: Bed to  Transfer to 1: Stand  Technique 1: Sit to stand  Transfer Device 1: Walker  Transfer Level of Assistance 1: Close supervision, Minimal verbal cues  Trials/Comments 1: Verbal cues to push from bed sit to stand.  Transfers 2  Transfer From 2: Stand to  Transfer to 2: Sit, Chair with arms  Technique 2: Stand to sit  Transfer Level of Assistance 2: Close supervision, Minimal verbal cues  Trials/Comments 2: Verbal cues for fully backing up to chair and to reach back for chair arms.    Stairs  Stairs: Yes  Stairs  Rails 1: None (Comment)  Curb Step 1: Yes  Device 1: Wheeled walker  Assistance 1: Contact guard, Minimal verbal cues  Comment/Number of Steps 1: Up/down platform step with RW, CGA and verbal cues for sequencing and technique.    Outcome Measures:  [facility] Basic Mobility  Turning from your back to your side while in a flat bed without using bedrails: A little  Moving from lying on your back to sitting on the side of a flat bed without using bedrails: A little  Moving to and from bed to chair (including a wheelchair): A little  Standing up from a chair using your arms (e.g. wheelchair or bedside chair): A little  To walk in hospital room: A little  Climbing 3-5 steps with railing: A little  Basic Mobility - Total Score: 18    Education  Documentation  Handouts, taught by Ann Marie Oliveira PTA at 4/10/2024 10:11 AM.  Learner: Patient  Readiness: Acceptance  Method: Explanation  Response: Verbalizes Understanding    Precautions, taught by Ann Marie Oliveira PTA at 4/10/2024 10:11 AM.  Learner: Patient  Readiness: Acceptance  Method: Explanation  Response: Verbalizes Understanding    Body Mechanics, taught by Ann Marie Oliveira PTA at 4/10/2024 10:11 AM.  Learner: Patient  Readiness: Acceptance  Method: Explanation  Response: Verbalizes Understanding    Home Exercise Program, taught by Ann Marie Oliveira PTA at 4/10/2024 10:11 AM.  Learner: Patient  Readiness: Acceptance  Method: Explanation  Response: Verbalizes Understanding    Mobility Training, taught by Ann Marie Oliveira PTA at 4/10/2024 10:11 AM.  Learner: Patient  Readiness: Acceptance  Method: Explanation  Response: Verbalizes Understanding    Education Comments  No comments found.        OP EDUCATION:  Outpatient Education  Individual(s) Educated: Patient  Education Provided: Home Exercise Program, Post-Op Precautions  Equipment: Thera-Band    Encounter Problems       Encounter Problems (Active)       Mobility       STG - Patient will ambulate 100' w/ supervision and RW (Progressing)       Start:  04/09/24    Expected End:  04/10/24            STG - Patient will ambulate up and down a curb/step w/ min assist and RW (Progressing)       Start:  04/09/24    Expected End:  04/10/24               Mobility       Pt will demonstrate understanding of post op TKA HEP.  (Progressing)       Start:  04/09/24    Expected End:  04/10/24               PT Transfers       STG - Patient to transfer to and from sit to supine IND (Progressing)       Start:  04/09/24    Expected End:  04/10/24            STG - Patient will transfer sit to and from stand MOD I (Progressing)       Start:  04/09/24    Expected End:  04/10/24

## 2024-04-10 NOTE — PROGRESS NOTES
"Stacey Dyer is a 59 y.o. female on day 0 of admission presenting with Arthritis of left knee.    Subjective   Comfortable in bed.  Pain controlled with oral medications.  Tolerating p.o. intake.  Denies chest pain or shortness of breath.  Wants to go home today.  Requesting 2 therapy sessions.       Objective     Physical Exam  Alert and oriented x 3  Left lower extremity: Drain output noted.  Dressing is dry.  Able to straight leg raise.  Compartments are soft throughout.  Active ankle dorsiflexion plantarflexion intact.  Otherwise neurovascularly intact.    Last Recorded Vitals  Blood pressure 124/73, pulse 57, temperature 36.6 °C (97.9 °F), temperature source Temporal, resp. rate 17, height 1.6 m (5' 3\"), weight 80.7 kg (178 lb), SpO2 95%.      Relevant Results  I reviewed postoperative imaging of left total knee.  Stable positioning of total knee components.    Results for orders placed or performed during the hospital encounter of 04/09/24 (from the past 24 hour(s))   CBC   Result Value Ref Range    WBC 10.1 4.4 - 11.3 x10*3/uL    nRBC 0.0 0.0 - 0.0 /100 WBCs    RBC 3.58 (L) 4.00 - 5.20 x10*6/uL    Hemoglobin 11.7 (L) 12.0 - 16.0 g/dL    Hematocrit 36.0 36.0 - 46.0 %     (H) 80 - 100 fL    MCH 32.7 26.0 - 34.0 pg    MCHC 32.5 32.0 - 36.0 g/dL    RDW 12.6 11.5 - 14.5 %    Platelets 177 150 - 450 x10*3/uL   Basic metabolic panel   Result Value Ref Range    Glucose 119 (H) 65 - 99 mg/dL    Sodium 135 133 - 145 mmol/L    Potassium 4.9 3.4 - 5.1 mmol/L    Chloride 103 97 - 107 mmol/L    Bicarbonate 21 (L) 24 - 31 mmol/L    Urea Nitrogen 12 8 - 25 mg/dL    Creatinine 0.70 0.40 - 1.60 mg/dL    eGFR >90 >60 mL/min/1.73m*2    Calcium 8.4 (L) 8.5 - 10.4 mg/dL    Anion Gap 11 <=19 mmol/L       Assessment/Plan   Principal Problem:    Arthritis of left knee  Active Problems:    Asthma    Pure hypercholesterolemia      Problem List:  Left total knee replacement: Postoperative day 1.  Mobilize physical therapy " weightbearing as tolerated.  Plan for home with home health care.  DVT risk: Aspirin twice daily        Rudy Mack MD

## 2024-04-10 NOTE — HH CARE COORDINATION
Home Care received a Referral for Physical Therapy. We have processed the referral for a Start of Care on 04/11.     If you have any questions or concerns, please feel free to contact us at [phone]. Follow the prompts, enter your five digit zip code, and you will be directed to your care team on [facility] 1.

## 2024-04-11 ENCOUNTER — HOME CARE VISIT (OUTPATIENT)
Dept: HOME HEALTH SERVICES | Facility: HOME HEALTH | Age: 59
End: 2024-04-11
Payer: COMMERCIAL

## 2024-04-11 VITALS — RESPIRATION RATE: 18 BRPM | SYSTOLIC BLOOD PRESSURE: 142 MMHG | HEART RATE: 64 BPM | DIASTOLIC BLOOD PRESSURE: 80 MMHG

## 2024-04-11 PROCEDURE — 0023 HH SOC

## 2024-04-11 PROCEDURE — G0151 HHCP-SERV OF PT,EA 15 MIN: HCPCS

## 2024-04-11 ASSESSMENT — ENCOUNTER SYMPTOMS
PAIN LOCATION - PAIN QUALITY: ACHING
HIGHEST PAIN SEVERITY IN PAST 24 HOURS: 9/10
PAIN: 1
PAIN LOCATION - PAIN SEVERITY: 6/10
PAIN LOCATION - PAIN FREQUENCY: CONSTANT
SUBJECTIVE PAIN PROGRESSION: GRADUALLY IMPROVING
PAIN LOCATION - EXACERBATING FACTORS: ACTIVITY
PERSON REPORTING PAIN: PATIENT
PAIN LOCATION: LEFT KNEE
PAIN LOCATION - RELIEVING FACTORS: ICE, MEDS
PAIN SEVERITY GOAL: 0/10
PAIN LOCATION - PAIN DURATION: ACUTE

## 2024-04-11 ASSESSMENT — ACTIVITIES OF DAILY LIVING (ADL)
AMBULATION ASSISTANCE ON FLAT SURFACES: 1
OASIS_M1830: 04
ENTERING_EXITING_HOME: NEEDS ASSISTANCE
AMBULATION_DISTANCE/DURATION_TOLERATED: 30 FT

## 2024-04-15 ENCOUNTER — HOME CARE VISIT (OUTPATIENT)
Dept: HOME HEALTH SERVICES | Facility: HOME HEALTH | Age: 59
End: 2024-04-15
Payer: COMMERCIAL

## 2024-04-15 PROCEDURE — G0151 HHCP-SERV OF PT,EA 15 MIN: HCPCS

## 2024-04-15 ASSESSMENT — ENCOUNTER SYMPTOMS
PAIN: 1
LOWEST PAIN SEVERITY IN PAST 24 HOURS: 4/10
PAIN LOCATION - PAIN FREQUENCY: CONSTANT
HIGHEST PAIN SEVERITY IN PAST 24 HOURS: 8/10
PERSON REPORTING PAIN: PATIENT
PAIN LOCATION: LEFT KNEE
PAIN LOCATION - EXACERBATING FACTORS: ACTIVITY, EXERCISE
SUBJECTIVE PAIN PROGRESSION: GRADUALLY IMPROVING
PAIN LOCATION - PAIN SEVERITY: 6/10
PAIN LOCATION - RELIEVING FACTORS: ICE, REST, MEDS
PAIN SEVERITY GOAL: 0/10
PAIN LOCATION - PAIN QUALITY: ACHING
PAIN LOCATION - PAIN DURATION: ACUTE

## 2024-04-18 ENCOUNTER — HOME CARE VISIT (OUTPATIENT)
Dept: HOME HEALTH SERVICES | Facility: HOME HEALTH | Age: 59
End: 2024-04-18
Payer: COMMERCIAL

## 2024-04-18 PROCEDURE — G0151 HHCP-SERV OF PT,EA 15 MIN: HCPCS

## 2024-04-18 ASSESSMENT — ENCOUNTER SYMPTOMS
SUBJECTIVE PAIN PROGRESSION: GRADUALLY IMPROVING
LOWEST PAIN SEVERITY IN PAST 24 HOURS: 4/10
PAIN LOCATION: LEFT KNEE
PAIN LOCATION - PAIN DURATION: ACUTE
PAIN LOCATION - EXACERBATING FACTORS: ACTIVITY, EXERCISES
HIGHEST PAIN SEVERITY IN PAST 24 HOURS: 7/10
PERSON REPORTING PAIN: PATIENT
PAIN SEVERITY GOAL: 0/10
PAIN LOCATION - PAIN SEVERITY: 5/10
PAIN LOCATION - PAIN QUALITY: ACHING
PAIN: 1
PAIN LOCATION - RELIEVING FACTORS: ICE, REST, MEDS
PAIN LOCATION - PAIN FREQUENCY: CONSTANT

## 2024-04-22 ENCOUNTER — HOME CARE VISIT (OUTPATIENT)
Dept: HOME HEALTH SERVICES | Facility: HOME HEALTH | Age: 59
End: 2024-04-22
Payer: COMMERCIAL

## 2024-04-22 PROCEDURE — G0151 HHCP-SERV OF PT,EA 15 MIN: HCPCS

## 2024-04-26 ENCOUNTER — HOME CARE VISIT (OUTPATIENT)
Dept: HOME HEALTH SERVICES | Facility: HOME HEALTH | Age: 59
End: 2024-04-26
Payer: COMMERCIAL

## 2024-04-26 PROCEDURE — G0151 HHCP-SERV OF PT,EA 15 MIN: HCPCS

## 2024-04-26 ASSESSMENT — ENCOUNTER SYMPTOMS
PAIN LOCATION: LEFT KNEE
LOWEST PAIN SEVERITY IN PAST 24 HOURS: 0/10
PAIN LOCATION - EXACERBATING FACTORS: ACTIVITY, EXERCISES
PAIN LOCATION - PAIN SEVERITY: 2/10
PAIN LOCATION - PAIN FREQUENCY: INTERMITTENT
HIGHEST PAIN SEVERITY IN PAST 24 HOURS: 7/10
PAIN SEVERITY GOAL: 0/10
PAIN LOCATION - RELIEVING FACTORS: MEDS, ICE
PAIN LOCATION - PAIN QUALITY: ACHING
PERSON REPORTING PAIN: PATIENT
PAIN: 1
PAIN LOCATION - PAIN DURATION: ACUTE
SUBJECTIVE PAIN PROGRESSION: GRADUALLY IMPROVING

## 2024-04-26 ASSESSMENT — ACTIVITIES OF DAILY LIVING (ADL)
OASIS_M1830: 00
HOME_HEALTH_OASIS: 00

## 2024-07-19 ENCOUNTER — APPOINTMENT (OUTPATIENT)
Dept: PRIMARY CARE | Facility: CLINIC | Age: 59
End: 2024-07-19
Payer: COMMERCIAL

## 2024-07-24 PROBLEM — D3A.098: Status: ACTIVE | Noted: 2023-09-12

## 2024-07-24 PROBLEM — E78.00 PURE HYPERCHOLESTEROLEMIA: Status: RESOLVED | Noted: 2023-09-12 | Resolved: 2024-07-24

## 2024-07-24 PROBLEM — M19.90 OSTEOARTHRITIS: Status: ACTIVE | Noted: 2024-07-24

## 2024-07-24 PROBLEM — K92.9 DIGESTIVE SYSTEM DISORDER: Status: ACTIVE | Noted: 2023-09-12

## 2024-07-24 PROBLEM — M70.20 OLECRANON BURSITIS: Status: ACTIVE | Noted: 2024-07-24

## 2024-07-24 PROBLEM — M26.609 TEMPOROMANDIBULAR JOINT DISORDER: Status: ACTIVE | Noted: 2024-07-24

## 2024-07-24 PROBLEM — M12.9 ARTHROPATHY: Status: ACTIVE | Noted: 2024-07-24

## 2024-07-24 PROBLEM — Z86.39 HISTORY OF ENDOCRINE DISORDER: Status: ACTIVE | Noted: 2024-07-24

## 2024-07-24 PROBLEM — F41.9 ANXIETY: Status: ACTIVE | Noted: 2024-07-24

## 2024-10-28 ENCOUNTER — APPOINTMENT (OUTPATIENT)
Dept: PREADMISSION TESTING | Facility: HOSPITAL | Age: 59
End: 2024-10-28
Payer: COMMERCIAL

## 2024-10-30 ENCOUNTER — PRE-ADMISSION TESTING (OUTPATIENT)
Dept: PREADMISSION TESTING | Facility: HOSPITAL | Age: 59
End: 2024-10-30
Payer: COMMERCIAL

## 2024-10-30 VITALS
HEIGHT: 63 IN | RESPIRATION RATE: 16 BRPM | BODY MASS INDEX: 34.55 KG/M2 | TEMPERATURE: 97.5 F | OXYGEN SATURATION: 95 % | WEIGHT: 195 LBS | DIASTOLIC BLOOD PRESSURE: 97 MMHG | HEART RATE: 76 BPM | SYSTOLIC BLOOD PRESSURE: 164 MMHG

## 2024-10-30 DIAGNOSIS — Z01.818 PREOPERATIVE TESTING: Primary | ICD-10-CM

## 2024-10-30 LAB
ANION GAP SERPL CALCULATED.3IONS-SCNC: 12 MMOL/L (ref 10–20)
BUN SERPL-MCNC: 12 MG/DL (ref 6–23)
CALCIUM SERPL-MCNC: 9.4 MG/DL (ref 8.6–10.3)
CHLORIDE SERPL-SCNC: 105 MMOL/L (ref 98–107)
CO2 SERPL-SCNC: 27 MMOL/L (ref 21–32)
CREAT SERPL-MCNC: 0.79 MG/DL (ref 0.5–1.05)
EGFRCR SERPLBLD CKD-EPI 2021: 85 ML/MIN/1.73M*2
ERYTHROCYTE [DISTWIDTH] IN BLOOD BY AUTOMATED COUNT: 12.7 % (ref 11.5–14.5)
GLUCOSE SERPL-MCNC: 100 MG/DL (ref 74–99)
HCT VFR BLD AUTO: 41.5 % (ref 36–46)
HGB BLD-MCNC: 14.2 G/DL (ref 12–16)
MCH RBC QN AUTO: 33.7 PG (ref 26–34)
MCHC RBC AUTO-ENTMCNC: 34.2 G/DL (ref 32–36)
MCV RBC AUTO: 99 FL (ref 80–100)
NRBC BLD-RTO: 0 /100 WBCS (ref 0–0)
PLATELET # BLD AUTO: 267 X10*3/UL (ref 150–450)
POTASSIUM SERPL-SCNC: 3.9 MMOL/L (ref 3.5–5.3)
RBC # BLD AUTO: 4.21 X10*6/UL (ref 4–5.2)
SODIUM SERPL-SCNC: 140 MMOL/L (ref 136–145)
WBC # BLD AUTO: 5.9 X10*3/UL (ref 4.4–11.3)

## 2024-10-30 PROCEDURE — 99214 OFFICE O/P EST MOD 30 MIN: CPT | Performed by: NURSE PRACTITIONER

## 2024-10-30 PROCEDURE — 80048 BASIC METABOLIC PNL TOTAL CA: CPT

## 2024-10-30 PROCEDURE — 36415 COLL VENOUS BLD VENIPUNCTURE: CPT

## 2024-10-30 PROCEDURE — 87081 CULTURE SCREEN ONLY: CPT | Mod: TRILAB

## 2024-10-30 PROCEDURE — 85027 COMPLETE CBC AUTOMATED: CPT

## 2024-10-30 RX ORDER — CHLORHEXIDINE GLUCONATE ORAL RINSE 1.2 MG/ML
SOLUTION DENTAL
Qty: 473 ML | Refills: 0 | Status: SHIPPED | OUTPATIENT
Start: 2024-10-30

## 2024-10-30 ASSESSMENT — DUKE ACTIVITY SCORE INDEX (DASI)
TOTAL_SCORE: 32.2
CAN YOU DO HEAVY WORK AROUND THE HOUSE LIKE SCRUBBING FLOORS OR LIFTING AND MOVING HEAVY FURNITURE: YES
CAN YOU HAVE SEXUAL RELATIONS: YES
CAN YOU TAKE CARE OF YOURSELF (EAT, DRESS, BATHE, OR USE TOILET): YES
CAN YOU CLIMB A FLIGHT OF STAIRS OR WALK UP A HILL: YES
CAN YOU WALK A BLOCK OR TWO ON LEVEL GROUND: YES
CAN YOU DO MODERATE WORK AROUND THE HOUSE LIKE VACUUMING, SWEEPING FLOORS OR CARRYING GROCERIES: YES
CAN YOU PARTICIPATE IN STRENOUS SPORTS LIKE SWIMMING, SINGLES TENNIS, FOOTBALL, BASKETBALL, OR SKIING: NO
DASI METS SCORE: 6.7
CAN YOU DO YARD WORK LIKE RAKING LEAVES, WEEDING OR PUSHING A MOWER: NO
CAN YOU RUN A SHORT DISTANCE: NO
CAN YOU WALK INDOORS, SUCH AS AROUND YOUR HOUSE: YES
CAN YOU DO LIGHT WORK AROUND THE HOUSE LIKE DUSTING OR WASHING DISHES: YES
CAN YOU PARTICIPATE IN MODERATE RECREATIONAL ACTIVITIES LIKE GOLF, BOWLING, DANCING, DOUBLES TENNIS OR THROWING A BASEBALL OR FOOTBALL: NO

## 2024-10-30 ASSESSMENT — ENCOUNTER SYMPTOMS
ACTIVITY CHANGE: 1
CARDIOVASCULAR NEGATIVE: 1
ROS GI COMMENTS: CHRONIC LOOSE STOOLS
RESPIRATORY NEGATIVE: 1
EYES NEGATIVE: 1
PSYCHIATRIC NEGATIVE: 1
ARTHRALGIAS: 1

## 2024-10-30 NOTE — H&P (VIEW-ONLY)
CPM/PAT Evaluation       Name: Stacey Dyer (Stacey Dyer)  /Age: 1965/59 y.o.     In-Person       Chief Complaint: Right knee osteoarthritis     HPI  A 59-year-old female with right knee osteoarthritis. History of right knee pain over the past 15 years that has become more severe in the last couple of years.  Pain stays in the right knee and symptoms increase with prolonged standing, ambulation, stairs, and transitioning from sitting to standing interfering with sleep, ADLs and quality. Conservative treatments /injections not helping.  She underwent left total knee arthroplasty 2024.  Denies fever, chills, chest pain, shortness of breath, syncope, or right lower extremity numbness.  She is scheduled for open right total knee arthroplasty.     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   [facility]    DUB (dysfunctional uterine bleeding) 2011    Fatty liver 2023    Hep C/B, iron,ferritin,antismooth muscle abs neg.  ( liver OK on previous CT scn)    Primary osteoarthritis involving multiple joints 2024    B knee arthritis  , Frames rec.    Pure hypercholesterolemia 2023    Right thyroid nodule 2018    Status post partial colectomy 2018    Temporomandibular joint syndrome 2023    Thyroid nodule       Bx neg.  [facility] released,TSH nl       Past Surgical History:   Procedure Laterality Date    APPENDECTOMY      CHOLECYSTECTOMY      COLECTOMY  2018    partial    KNEE ARTHROPLASTY Left          Allergies   Allergen Reactions    Bee Venom Protein (Honey Bee) Swelling     FACIALSWELLING THROAT SWELLING    Azithromycin Nausea/vomiting       Current Outpatient Medications   Medication Sig Dispense Refill    acetaminophen (Tylenol) 325 mg tablet Take 1 tablet (325 mg) by mouth every 6 hours if needed for mild pain (1 - 3).      Advair  Diskus 250-50 mcg/dose diskus inhaler INHALE 1 PUFF BY MOUTH TWICE A DAY FOR 90 DAYS *RINSE MOUTH AFTER USE* 180 each 2    albuterol 90 mcg/actuation inhaler INHALE 2 PUFFS BY MOUTH EVERY 4 HOURS AS NEEDED FOR 90 DAYS 8 g 2    chlorhexidine (Peridex) 0.12 % solution Use cap to measure 15 mL.  Swish/gargle mouthwash for at least 30 seconds.  Do not swallow.  Use night before surgery after brushing teeth and morning of surgery after brushing teeth. 473 mL 0    cholestyramine light (Prevalite) 4 gram packet USE 1 PACKET 1 TO 2 TIMES A DAY ORALLY AS DIRECTED FOR 90 DAYS TOTAL 30 60 packet 4    EpiPen 2-Thomas 0.3 mg/0.3 mL injection syringe as directed Injection for anaphylactic shock for 30 days      ibuprofen 200 mg tablet Take 2 tablets (400 mg) by mouth every 6 hours if needed for mild pain (1 - 3).      pseudoephedrine (Sudafed) 30 mg tablet Take 2 tablets (60 mg) by mouth every 6 hours if needed.       No current facility-administered medications for this visit.       Review of Systems   Constitutional:  Positive for activity change.   HENT: Negative.     Eyes: Negative.    Respiratory: Negative.     Cardiovascular: Negative.    Gastrointestinal:         Chronic loose stools   Genitourinary: Negative.    Musculoskeletal:  Positive for arthralgias and gait problem.        Right knee pain, antalgic gait, unbalanced gait since left total knee replacement   Skin: Negative.    Psychiatric/Behavioral: Negative.          Physical Exam  Vitals (Elevated BP-patient advised to monitor BP and follow-up with PCP) reviewed.   HENT:      Head: Normocephalic and atraumatic.      Mouth/Throat:      Mouth: Mucous membranes are moist.   Eyes:      Pupils: Pupils are equal, round, and reactive to light.   Cardiovascular:      Rate and Rhythm: Normal rate and regular rhythm.   Pulmonary:      Effort: Pulmonary effort is normal.      Breath sounds: Normal breath sounds.   Abdominal:      Palpations: Abdomen is soft.   Musculoskeletal:     "  Cervical back: Normal range of motion.      Comments: Right knee pain, antalgic gait   Skin:     General: Skin is warm and dry.   Neurological:      Mental Status: She is alert and oriented to person, place, and time.   Psychiatric:         Mood and Affect: Mood normal.          PAT AIRWAY:   Airway:     Mallampati::  II    Neck ROM::  Full  normal        BP (!) 164/97   Pulse 76   Temp 36.4 °C (97.5 °F) (Temporal)   Resp 16   Ht 1.6 m (5' 3\")   Wt 88.5 kg (195 lb)   SpO2 95%   BMI 34.54 kg/m²       Stop Bang Score 2   CHADS: 1.9%  DASI risk score: 1.9%  METS: 35.45  VIRAJ: 7.1  RCRI: 0.9%  ASA: II  ARISCAT:1.6%  PAT orders CBC, BMP, MRSA  Patient scheduled with PCP 11/1/2024      Assessment and Plan:     Right knee osteoarthritis Plan: Open right total knee arthroplasty.  Asthma-well-controlled-Advair disc-albuterol inhaler  History of carcinoid tumor status post appendectomy, partial colectomy 2018  Chronic constipation  BMI 34.54  "

## 2024-11-01 ENCOUNTER — OFFICE VISIT (OUTPATIENT)
Dept: PRIMARY CARE | Facility: CLINIC | Age: 59
End: 2024-11-01
Payer: COMMERCIAL

## 2024-11-01 ENCOUNTER — APPOINTMENT (OUTPATIENT)
Dept: PRIMARY CARE | Facility: CLINIC | Age: 59
End: 2024-11-01
Payer: COMMERCIAL

## 2024-11-01 VITALS
HEART RATE: 66 BPM | BODY MASS INDEX: 34.2 KG/M2 | DIASTOLIC BLOOD PRESSURE: 82 MMHG | SYSTOLIC BLOOD PRESSURE: 122 MMHG | WEIGHT: 193 LBS | TEMPERATURE: 96.3 F | OXYGEN SATURATION: 98 % | HEIGHT: 63 IN

## 2024-11-01 DIAGNOSIS — M16.11 PRIMARY OSTEOARTHRITIS OF RIGHT HIP: ICD-10-CM

## 2024-11-01 DIAGNOSIS — K92.9: ICD-10-CM

## 2024-11-01 DIAGNOSIS — J45.30 MILD PERSISTENT ASTHMA WITHOUT COMPLICATION (HHS-HCC): ICD-10-CM

## 2024-11-01 DIAGNOSIS — Z01.818 PREOPERATIVE CLEARANCE: Primary | ICD-10-CM

## 2024-11-01 LAB — STAPHYLOCOCCUS SPEC CULT: ABNORMAL

## 2024-11-01 ASSESSMENT — ENCOUNTER SYMPTOMS
CARDIOVASCULAR NEGATIVE: 1
CONSTITUTIONAL NEGATIVE: 1
DIARRHEA: 1
HEMATOLOGIC/LYMPHATIC NEGATIVE: 1
DEPRESSION: 0
ENDOCRINE NEGATIVE: 1
ALLERGIC/IMMUNOLOGIC NEGATIVE: 1
PSYCHIATRIC NEGATIVE: 1
RESPIRATORY NEGATIVE: 1
LOSS OF SENSATION IN FEET: 0
EYES NEGATIVE: 1
OCCASIONAL FEELINGS OF UNSTEADINESS: 0

## 2024-11-01 ASSESSMENT — LIFESTYLE VARIABLES
HAS A RELATIVE, FRIEND, DOCTOR, OR ANOTHER HEALTH PROFESSIONAL EXPRESSED CONCERN ABOUT YOUR DRINKING OR SUGGESTED YOU CUT DOWN: NO
SKIP TO QUESTIONS 9-10: 1
HOW OFTEN DURING THE LAST YEAR HAVE YOU FAILED TO DO WHAT WAS NORMALLY EXPECTED FROM YOU BECAUSE OF DRINKING: NEVER
HOW OFTEN DO YOU HAVE SIX OR MORE DRINKS ON ONE OCCASION: NEVER
HOW OFTEN DURING THE LAST YEAR HAVE YOU HAD A FEELING OF GUILT OR REMORSE AFTER DRINKING: NEVER
AUDIT-C TOTAL SCORE: 0
AUDIT TOTAL SCORE: 0
HOW OFTEN DURING THE LAST YEAR HAVE YOU FOUND THAT YOU WERE NOT ABLE TO STOP DRINKING ONCE YOU HAD STARTED: NEVER
HOW OFTEN DURING THE LAST YEAR HAVE YOU NEEDED AN ALCOHOLIC DRINK FIRST THING IN THE MORNING TO GET YOURSELF GOING AFTER A NIGHT OF HEAVY DRINKING: NEVER
HOW OFTEN DURING THE LAST YEAR HAVE YOU BEEN UNABLE TO REMEMBER WHAT HAPPENED THE NIGHT BEFORE BECAUSE YOU HAD BEEN DRINKING: NEVER
HAVE YOU OR SOMEONE ELSE BEEN INJURED AS A RESULT OF YOUR DRINKING: NO
HOW MANY STANDARD DRINKS CONTAINING ALCOHOL DO YOU HAVE ON A TYPICAL DAY: PATIENT DOES NOT DRINK
HOW OFTEN DO YOU HAVE A DRINK CONTAINING ALCOHOL: NEVER

## 2024-11-01 ASSESSMENT — PATIENT HEALTH QUESTIONNAIRE - PHQ9
1. LITTLE INTEREST OR PLEASURE IN DOING THINGS: NOT AT ALL
2. FEELING DOWN, DEPRESSED OR HOPELESS: NOT AT ALL
SUM OF ALL RESPONSES TO PHQ9 QUESTIONS 1 AND 2: 0

## 2024-11-01 ASSESSMENT — PAIN SCALES - GENERAL: PAINLEVEL_OUTOF10: 0-NO PAIN

## 2024-11-11 ENCOUNTER — HOSPITAL ENCOUNTER (OUTPATIENT)
Facility: HOSPITAL | Age: 59
Discharge: HOME | End: 2024-11-13
Attending: ORTHOPAEDIC SURGERY | Admitting: ORTHOPAEDIC SURGERY
Payer: COMMERCIAL

## 2024-11-11 ENCOUNTER — ANESTHESIA EVENT (OUTPATIENT)
Dept: OPERATING ROOM | Facility: HOSPITAL | Age: 59
End: 2024-11-11
Payer: COMMERCIAL

## 2024-11-11 ENCOUNTER — ANESTHESIA (OUTPATIENT)
Dept: OPERATING ROOM | Facility: HOSPITAL | Age: 59
End: 2024-11-11
Payer: COMMERCIAL

## 2024-11-11 ENCOUNTER — APPOINTMENT (OUTPATIENT)
Dept: RADIOLOGY | Facility: HOSPITAL | Age: 59
End: 2024-11-11
Payer: COMMERCIAL

## 2024-11-11 DIAGNOSIS — M17.11 ARTHRITIS OF RIGHT KNEE: ICD-10-CM

## 2024-11-11 DIAGNOSIS — M17.11 PRIMARY OSTEOARTHRITIS OF RIGHT KNEE: Primary | ICD-10-CM

## 2024-11-11 PROBLEM — E66.811 CLASS 1 OBESITY IN ADULT: Status: ACTIVE | Noted: 2024-11-11

## 2024-11-11 PROCEDURE — A4649 SURGICAL SUPPLIES: HCPCS | Performed by: ORTHOPAEDIC SURGERY

## 2024-11-11 PROCEDURE — 7100000001 HC RECOVERY ROOM TIME - INITIAL BASE CHARGE: Performed by: ORTHOPAEDIC SURGERY

## 2024-11-11 PROCEDURE — 2500000004 HC RX 250 GENERAL PHARMACY W/ HCPCS (ALT 636 FOR OP/ED): Performed by: STUDENT IN AN ORGANIZED HEALTH CARE EDUCATION/TRAINING PROGRAM

## 2024-11-11 PROCEDURE — 2500000001 HC RX 250 WO HCPCS SELF ADMINISTERED DRUGS (ALT 637 FOR MEDICARE OP): Performed by: NURSE PRACTITIONER

## 2024-11-11 PROCEDURE — 2500000001 HC RX 250 WO HCPCS SELF ADMINISTERED DRUGS (ALT 637 FOR MEDICARE OP): Performed by: STUDENT IN AN ORGANIZED HEALTH CARE EDUCATION/TRAINING PROGRAM

## 2024-11-11 PROCEDURE — 7100000011 HC EXTENDED STAY RECOVERY HOURLY - NURSING UNIT

## 2024-11-11 PROCEDURE — 9420000001 HC RT PATIENT EDUCATION 5 MIN

## 2024-11-11 PROCEDURE — 3600000018 HC OR TIME - INITIAL BASE CHARGE - PROCEDURE LEVEL SIX: Performed by: ORTHOPAEDIC SURGERY

## 2024-11-11 PROCEDURE — 3700000001 HC GENERAL ANESTHESIA TIME - INITIAL BASE CHARGE: Performed by: ORTHOPAEDIC SURGERY

## 2024-11-11 PROCEDURE — 7100000002 HC RECOVERY ROOM TIME - EACH INCREMENTAL 1 MINUTE: Performed by: ORTHOPAEDIC SURGERY

## 2024-11-11 PROCEDURE — 73560 X-RAY EXAM OF KNEE 1 OR 2: CPT | Mod: RIGHT SIDE | Performed by: RADIOLOGY

## 2024-11-11 PROCEDURE — A27447 PR TOTAL KNEE ARTHROPLASTY

## 2024-11-11 PROCEDURE — 2500000001 HC RX 250 WO HCPCS SELF ADMINISTERED DRUGS (ALT 637 FOR MEDICARE OP): Performed by: ORTHOPAEDIC SURGERY

## 2024-11-11 PROCEDURE — 64447 NJX AA&/STRD FEMORAL NRV IMG: CPT | Performed by: STUDENT IN AN ORGANIZED HEALTH CARE EDUCATION/TRAINING PROGRAM

## 2024-11-11 PROCEDURE — 2500000005 HC RX 250 GENERAL PHARMACY W/O HCPCS: Performed by: ORTHOPAEDIC SURGERY

## 2024-11-11 PROCEDURE — A6213 FOAM DRG >16<=48 SQ IN W/BDR: HCPCS | Performed by: ORTHOPAEDIC SURGERY

## 2024-11-11 PROCEDURE — A9999 DME SUPPLY OR ACCESSORY, NOS: HCPCS | Performed by: ORTHOPAEDIC SURGERY

## 2024-11-11 PROCEDURE — 2500000005 HC RX 250 GENERAL PHARMACY W/O HCPCS

## 2024-11-11 PROCEDURE — 3600000017 HC OR TIME - EACH INCREMENTAL 1 MINUTE - PROCEDURE LEVEL SIX: Performed by: ORTHOPAEDIC SURGERY

## 2024-11-11 PROCEDURE — C1776 JOINT DEVICE (IMPLANTABLE): HCPCS | Performed by: ORTHOPAEDIC SURGERY

## 2024-11-11 PROCEDURE — 2720000007 HC OR 272 NO HCPCS: Performed by: ORTHOPAEDIC SURGERY

## 2024-11-11 PROCEDURE — 2500000004 HC RX 250 GENERAL PHARMACY W/ HCPCS (ALT 636 FOR OP/ED)

## 2024-11-11 PROCEDURE — 99222 1ST HOSP IP/OBS MODERATE 55: CPT | Performed by: NURSE PRACTITIONER

## 2024-11-11 PROCEDURE — 3700000002 HC GENERAL ANESTHESIA TIME - EACH INCREMENTAL 1 MINUTE: Performed by: ORTHOPAEDIC SURGERY

## 2024-11-11 PROCEDURE — 73560 X-RAY EXAM OF KNEE 1 OR 2: CPT | Mod: RT

## 2024-11-11 PROCEDURE — 2500000004 HC RX 250 GENERAL PHARMACY W/ HCPCS (ALT 636 FOR OP/ED): Mod: JZ | Performed by: ORTHOPAEDIC SURGERY

## 2024-11-11 PROCEDURE — 2780000003 HC OR 278 NO HCPCS: Performed by: ORTHOPAEDIC SURGERY

## 2024-11-11 PROCEDURE — A27447 PR TOTAL KNEE ARTHROPLASTY: Performed by: STUDENT IN AN ORGANIZED HEALTH CARE EDUCATION/TRAINING PROGRAM

## 2024-11-11 DEVICE — SIMPLEX® HV IS A FAST-SETTING ACRYLIC RESIN FOR USE IN BONE SURGERY. MIXING THE TWO SEPARATE STERILE COMPONENTS PRODUCES A DUCTILE BONE CEMENT WHICH, AFTER HARDENING, FIXES THE IMPLANT AND TRANSFERS STRESSES PRODUCED DURING MOVEMENT EVENLY TO THE BONE. SIMPLEX® HV CEMENT POWDER ALSO CONTAINS INSOLUBLE ZIRCONIUM DIOXIDE AS AN X-RAY CONTRAST MEDIUM. SIMPLEX® HV DOES NOT EMIT A SIGNAL AND DOES NOT POSE A SAFETY RISK IN A MAGNETIC RESONANCE ENVIRONMENT.
Type: IMPLANTABLE DEVICE | Site: KNEE | Status: FUNCTIONAL
Brand: SIMPLEX HV

## 2024-11-11 DEVICE — PATELLA
Type: IMPLANTABLE DEVICE | Site: KNEE | Status: FUNCTIONAL
Brand: TRIATHLON

## 2024-11-11 DEVICE — POSTERIOR STABILIZED FEMORAL
Type: IMPLANTABLE DEVICE | Site: KNEE | Status: FUNCTIONAL
Brand: TRIATHLON

## 2024-11-11 DEVICE — UNIVERSAL TIBIAL BASEPLATE
Type: IMPLANTABLE DEVICE | Site: KNEE | Status: FUNCTIONAL
Brand: TRIATHLON

## 2024-11-11 DEVICE — TIBIAL BEARING INSERT - PS
Type: IMPLANTABLE DEVICE | Site: KNEE | Status: FUNCTIONAL
Brand: TRIATHLON

## 2024-11-11 RX ORDER — FLUTICASONE FUROATE AND VILANTEROL 200; 25 UG/1; UG/1
1 POWDER RESPIRATORY (INHALATION)
Status: DISCONTINUED | OUTPATIENT
Start: 2024-11-12 | End: 2024-11-13 | Stop reason: HOSPADM

## 2024-11-11 RX ORDER — ACETAMINOPHEN 500 MG
1000 TABLET ORAL EVERY 6 HOURS
Status: DISCONTINUED | OUTPATIENT
Start: 2024-11-11 | End: 2024-11-13 | Stop reason: HOSPADM

## 2024-11-11 RX ORDER — CELECOXIB 200 MG/1
400 CAPSULE ORAL ONCE
Status: COMPLETED | OUTPATIENT
Start: 2024-11-11 | End: 2024-11-11

## 2024-11-11 RX ORDER — ASPIRIN 81 MG/1
81 TABLET ORAL 2 TIMES DAILY
Status: DISCONTINUED | OUTPATIENT
Start: 2024-11-11 | End: 2024-11-13 | Stop reason: HOSPADM

## 2024-11-11 RX ORDER — CHOLESTYRAMINE 4 G/4.8G
4 POWDER, FOR SUSPENSION ORAL
Status: DISCONTINUED | OUTPATIENT
Start: 2024-11-11 | End: 2024-11-13 | Stop reason: HOSPADM

## 2024-11-11 RX ORDER — ACETAMINOPHEN 325 MG/1
650 TABLET ORAL ONCE
Status: COMPLETED | OUTPATIENT
Start: 2024-11-11 | End: 2024-11-11

## 2024-11-11 RX ORDER — CEFAZOLIN SODIUM 2 G/100ML
2 INJECTION, SOLUTION INTRAVENOUS ONCE
Status: DISCONTINUED | OUTPATIENT
Start: 2024-11-11 | End: 2024-11-11 | Stop reason: HOSPADM

## 2024-11-11 RX ORDER — MORPHINE SULFATE 2 MG/ML
2 INJECTION, SOLUTION INTRAMUSCULAR; INTRAVENOUS EVERY 2 HOUR PRN
Status: DISCONTINUED | OUTPATIENT
Start: 2024-11-11 | End: 2024-11-13 | Stop reason: HOSPADM

## 2024-11-11 RX ORDER — ONDANSETRON HYDROCHLORIDE 2 MG/ML
4 INJECTION, SOLUTION INTRAVENOUS ONCE AS NEEDED
Status: DISCONTINUED | OUTPATIENT
Start: 2024-11-11 | End: 2024-11-11 | Stop reason: HOSPADM

## 2024-11-11 RX ORDER — PROPOFOL 10 MG/ML
INJECTION, EMULSION INTRAVENOUS AS NEEDED
Status: DISCONTINUED | OUTPATIENT
Start: 2024-11-11 | End: 2024-11-11

## 2024-11-11 RX ORDER — SODIUM CHLORIDE, SODIUM LACTATE, POTASSIUM CHLORIDE, CALCIUM CHLORIDE 600; 310; 30; 20 MG/100ML; MG/100ML; MG/100ML; MG/100ML
40 INJECTION, SOLUTION INTRAVENOUS CONTINUOUS
Status: DISCONTINUED | OUTPATIENT
Start: 2024-11-11 | End: 2024-11-11 | Stop reason: HOSPADM

## 2024-11-11 RX ORDER — LABETALOL HYDROCHLORIDE 5 MG/ML
5 INJECTION, SOLUTION INTRAVENOUS EVERY 5 MIN PRN
Status: DISCONTINUED | OUTPATIENT
Start: 2024-11-11 | End: 2024-11-11 | Stop reason: HOSPADM

## 2024-11-11 RX ORDER — ACETAMINOPHEN 325 MG/1
975 TABLET ORAL ONCE
Status: COMPLETED | OUTPATIENT
Start: 2024-11-11 | End: 2024-11-11

## 2024-11-11 RX ORDER — PHENYLEPHRINE HCL IN 0.9% NACL 0.4MG/10ML
SYRINGE (ML) INTRAVENOUS AS NEEDED
Status: DISCONTINUED | OUTPATIENT
Start: 2024-11-11 | End: 2024-11-11

## 2024-11-11 RX ORDER — FENTANYL CITRATE 50 UG/ML
INJECTION, SOLUTION INTRAMUSCULAR; INTRAVENOUS AS NEEDED
Status: DISCONTINUED | OUTPATIENT
Start: 2024-11-11 | End: 2024-11-11

## 2024-11-11 RX ORDER — IPRATROPIUM BROMIDE 0.5 MG/2.5ML
500 SOLUTION RESPIRATORY (INHALATION) EVERY 30 MIN PRN
Status: DISCONTINUED | OUTPATIENT
Start: 2024-11-11 | End: 2024-11-11 | Stop reason: HOSPADM

## 2024-11-11 RX ORDER — SODIUM CHLORIDE, SODIUM LACTATE, POTASSIUM CHLORIDE, CALCIUM CHLORIDE 600; 310; 30; 20 MG/100ML; MG/100ML; MG/100ML; MG/100ML
INJECTION, SOLUTION INTRAVENOUS CONTINUOUS PRN
Status: DISCONTINUED | OUTPATIENT
Start: 2024-11-11 | End: 2024-11-11

## 2024-11-11 RX ORDER — DOCUSATE SODIUM 100 MG/1
100 CAPSULE, LIQUID FILLED ORAL 2 TIMES DAILY
Status: DISCONTINUED | OUTPATIENT
Start: 2024-11-11 | End: 2024-11-11

## 2024-11-11 RX ORDER — BISMUTH SUBSALICYLATE 262 MG
1 TABLET,CHEWABLE ORAL DAILY
Status: DISCONTINUED | OUTPATIENT
Start: 2024-11-11 | End: 2024-11-13 | Stop reason: HOSPADM

## 2024-11-11 RX ORDER — KETOROLAC TROMETHAMINE 30 MG/ML
30 INJECTION, SOLUTION INTRAMUSCULAR; INTRAVENOUS ONCE
Status: COMPLETED | OUTPATIENT
Start: 2024-11-11 | End: 2024-11-11

## 2024-11-11 RX ORDER — PREGABALIN 75 MG/1
75 CAPSULE ORAL ONCE
Status: COMPLETED | OUTPATIENT
Start: 2024-11-11 | End: 2024-11-11

## 2024-11-11 RX ORDER — POLYETHYLENE GLYCOL 3350 17 G/17G
17 POWDER, FOR SOLUTION ORAL DAILY
Status: DISCONTINUED | OUTPATIENT
Start: 2024-11-11 | End: 2024-11-11

## 2024-11-11 RX ORDER — LIDOCAINE HYDROCHLORIDE 10 MG/ML
INJECTION, SOLUTION INFILTRATION; PERINEURAL AS NEEDED
Status: DISCONTINUED | OUTPATIENT
Start: 2024-11-11 | End: 2024-11-11

## 2024-11-11 RX ORDER — ALBUTEROL SULFATE 0.83 MG/ML
2.5 SOLUTION RESPIRATORY (INHALATION) EVERY 30 MIN PRN
Status: DISCONTINUED | OUTPATIENT
Start: 2024-11-11 | End: 2024-11-11 | Stop reason: HOSPADM

## 2024-11-11 RX ORDER — FENTANYL CITRATE 50 UG/ML
50 INJECTION, SOLUTION INTRAMUSCULAR; INTRAVENOUS ONCE
Status: COMPLETED | OUTPATIENT
Start: 2024-11-11 | End: 2024-11-11

## 2024-11-11 RX ORDER — FENTANYL CITRATE 50 UG/ML
50 INJECTION, SOLUTION INTRAMUSCULAR; INTRAVENOUS EVERY 5 MIN PRN
Status: DISCONTINUED | OUTPATIENT
Start: 2024-11-11 | End: 2024-11-11 | Stop reason: HOSPADM

## 2024-11-11 RX ORDER — HYDROMORPHONE HYDROCHLORIDE 0.2 MG/ML
0.2 INJECTION INTRAMUSCULAR; INTRAVENOUS; SUBCUTANEOUS EVERY 5 MIN PRN
Status: DISCONTINUED | OUTPATIENT
Start: 2024-11-11 | End: 2024-11-11 | Stop reason: HOSPADM

## 2024-11-11 RX ORDER — OXYCODONE HCL 10 MG/1
20 TABLET, FILM COATED, EXTENDED RELEASE ORAL ONCE
Status: COMPLETED | OUTPATIENT
Start: 2024-11-11 | End: 2024-11-11

## 2024-11-11 RX ORDER — IPRATROPIUM BROMIDE AND ALBUTEROL SULFATE 2.5; .5 MG/3ML; MG/3ML
3 SOLUTION RESPIRATORY (INHALATION) 2 TIMES DAILY PRN
Status: DISCONTINUED | OUTPATIENT
Start: 2024-11-11 | End: 2024-11-13 | Stop reason: HOSPADM

## 2024-11-11 RX ORDER — SODIUM CHLORIDE, SODIUM LACTATE, POTASSIUM CHLORIDE, CALCIUM CHLORIDE 600; 310; 30; 20 MG/100ML; MG/100ML; MG/100ML; MG/100ML
125 INJECTION, SOLUTION INTRAVENOUS CONTINUOUS
Status: ACTIVE | OUTPATIENT
Start: 2024-11-11 | End: 2024-11-11

## 2024-11-11 RX ORDER — KETOROLAC TROMETHAMINE 30 MG/ML
15 INJECTION, SOLUTION INTRAMUSCULAR; INTRAVENOUS EVERY 6 HOURS PRN
Status: DISCONTINUED | OUTPATIENT
Start: 2024-11-11 | End: 2024-11-13 | Stop reason: HOSPADM

## 2024-11-11 RX ORDER — OXYCODONE HYDROCHLORIDE 5 MG/1
10 TABLET ORAL EVERY 4 HOURS PRN
Status: DISCONTINUED | OUTPATIENT
Start: 2024-11-11 | End: 2024-11-13 | Stop reason: HOSPADM

## 2024-11-11 RX ORDER — ONDANSETRON HYDROCHLORIDE 2 MG/ML
INJECTION, SOLUTION INTRAVENOUS AS NEEDED
Status: DISCONTINUED | OUTPATIENT
Start: 2024-11-11 | End: 2024-11-11

## 2024-11-11 RX ORDER — OXYCODONE HYDROCHLORIDE 5 MG/1
10 TABLET ORAL EVERY 6 HOURS PRN
Status: COMPLETED | OUTPATIENT
Start: 2024-11-11 | End: 2024-11-11

## 2024-11-11 RX ORDER — OXYCODONE HYDROCHLORIDE 5 MG/1
5 TABLET ORAL EVERY 4 HOURS PRN
Status: DISCONTINUED | OUTPATIENT
Start: 2024-11-11 | End: 2024-11-13 | Stop reason: HOSPADM

## 2024-11-11 RX ORDER — MIDAZOLAM HYDROCHLORIDE 1 MG/ML
2 INJECTION, SOLUTION INTRAMUSCULAR; INTRAVENOUS ONCE
Status: COMPLETED | OUTPATIENT
Start: 2024-11-11 | End: 2024-11-11

## 2024-11-11 RX ORDER — DOCUSATE SODIUM 100 MG/1
100 CAPSULE, LIQUID FILLED ORAL 2 TIMES DAILY PRN
Status: DISCONTINUED | OUTPATIENT
Start: 2024-11-11 | End: 2024-11-13 | Stop reason: HOSPADM

## 2024-11-11 RX ORDER — CEFAZOLIN SODIUM 2 G/100ML
2 INJECTION, SOLUTION INTRAVENOUS EVERY 8 HOURS
Status: COMPLETED | OUTPATIENT
Start: 2024-11-11 | End: 2024-11-12

## 2024-11-11 RX ORDER — MEPERIDINE HYDROCHLORIDE 25 MG/ML
12.5 INJECTION INTRAMUSCULAR; INTRAVENOUS; SUBCUTANEOUS EVERY 10 MIN PRN
Status: DISCONTINUED | OUTPATIENT
Start: 2024-11-11 | End: 2024-11-11 | Stop reason: HOSPADM

## 2024-11-11 RX ORDER — MIDAZOLAM HYDROCHLORIDE 1 MG/ML
1 INJECTION INTRAMUSCULAR; INTRAVENOUS ONCE
Status: COMPLETED | OUTPATIENT
Start: 2024-11-11 | End: 2024-11-11

## 2024-11-11 RX ORDER — ONDANSETRON HYDROCHLORIDE 2 MG/ML
4 INJECTION, SOLUTION INTRAVENOUS EVERY 8 HOURS PRN
Status: DISCONTINUED | OUTPATIENT
Start: 2024-11-11 | End: 2024-11-13 | Stop reason: HOSPADM

## 2024-11-11 RX ORDER — TRANEXAMIC ACID 100 MG/ML
INJECTION, SOLUTION INTRAVENOUS AS NEEDED
Status: DISCONTINUED | OUTPATIENT
Start: 2024-11-11 | End: 2024-11-11

## 2024-11-11 RX ORDER — CEFAZOLIN 1 G/1
INJECTION, POWDER, FOR SOLUTION INTRAVENOUS AS NEEDED
Status: DISCONTINUED | OUTPATIENT
Start: 2024-11-11 | End: 2024-11-11

## 2024-11-11 SDOH — ECONOMIC STABILITY: HOUSING INSECURITY: AT ANY TIME IN THE PAST 12 MONTHS, WERE YOU HOMELESS OR LIVING IN A SHELTER (INCLUDING NOW)?: NO

## 2024-11-11 SDOH — SOCIAL STABILITY: SOCIAL INSECURITY
WITHIN THE LAST YEAR, HAVE YOU BEEN KICKED, HIT, SLAPPED, OR OTHERWISE PHYSICALLY HURT BY YOUR PARTNER OR EX-PARTNER?: NO

## 2024-11-11 SDOH — SOCIAL STABILITY: SOCIAL INSECURITY
WITHIN THE LAST YEAR, HAVE YOU BEEN RAPED OR FORCED TO HAVE ANY KIND OF SEXUAL ACTIVITY BY YOUR PARTNER OR EX-PARTNER?: NO

## 2024-11-11 SDOH — SOCIAL STABILITY: SOCIAL INSECURITY: WERE YOU ABLE TO COMPLETE ALL THE BEHAVIORAL HEALTH SCREENINGS?: YES

## 2024-11-11 SDOH — HEALTH STABILITY: MENTAL HEALTH: HOW OFTEN DO YOU HAVE SIX OR MORE DRINKS ON ONE OCCASION?: NEVER

## 2024-11-11 SDOH — ECONOMIC STABILITY: INCOME INSECURITY: IN THE PAST 12 MONTHS HAS THE ELECTRIC, GAS, OIL, OR WATER COMPANY THREATENED TO SHUT OFF SERVICES IN YOUR HOME?: NO

## 2024-11-11 SDOH — SOCIAL STABILITY: SOCIAL INSECURITY: WITHIN THE LAST YEAR, HAVE YOU BEEN HUMILIATED OR EMOTIONALLY ABUSED IN OTHER WAYS BY YOUR PARTNER OR EX-PARTNER?: NO

## 2024-11-11 SDOH — SOCIAL STABILITY: SOCIAL NETWORK
DO YOU BELONG TO ANY CLUBS OR ORGANIZATIONS SUCH AS CHURCH GROUPS, UNIONS, FRATERNAL OR ATHLETIC GROUPS, OR SCHOOL GROUPS?: PATIENT DECLINED

## 2024-11-11 SDOH — HEALTH STABILITY: MENTAL HEALTH: CURRENT SMOKER: 0

## 2024-11-11 SDOH — HEALTH STABILITY: MENTAL HEALTH: HOW MANY DRINKS CONTAINING ALCOHOL DO YOU HAVE ON A TYPICAL DAY WHEN YOU ARE DRINKING?: PATIENT DOES NOT DRINK

## 2024-11-11 SDOH — ECONOMIC STABILITY: FOOD INSECURITY: HOW HARD IS IT FOR YOU TO PAY FOR THE VERY BASICS LIKE FOOD, HOUSING, MEDICAL CARE, AND HEATING?: NOT VERY HARD

## 2024-11-11 SDOH — ECONOMIC STABILITY: FOOD INSECURITY: WITHIN THE PAST 12 MONTHS, YOU WORRIED THAT YOUR FOOD WOULD RUN OUT BEFORE YOU GOT THE MONEY TO BUY MORE.: NEVER TRUE

## 2024-11-11 SDOH — ECONOMIC STABILITY: HOUSING INSECURITY: IN THE PAST 12 MONTHS, HOW MANY TIMES HAVE YOU MOVED WHERE YOU WERE LIVING?: 0

## 2024-11-11 SDOH — HEALTH STABILITY: MENTAL HEALTH: HOW OFTEN DO YOU HAVE A DRINK CONTAINING ALCOHOL?: NEVER

## 2024-11-11 SDOH — HEALTH STABILITY: PHYSICAL HEALTH
ON AVERAGE, HOW MANY DAYS PER WEEK DO YOU ENGAGE IN MODERATE TO STRENUOUS EXERCISE (LIKE A BRISK WALK)?: PATIENT DECLINED

## 2024-11-11 SDOH — HEALTH STABILITY: PHYSICAL HEALTH
HOW OFTEN DO YOU NEED TO HAVE SOMEONE HELP YOU WHEN YOU READ INSTRUCTIONS, PAMPHLETS, OR OTHER WRITTEN MATERIAL FROM YOUR DOCTOR OR PHARMACY?: NEVER

## 2024-11-11 SDOH — SOCIAL STABILITY: SOCIAL INSECURITY: ABUSE: ADULT

## 2024-11-11 SDOH — SOCIAL STABILITY: SOCIAL INSECURITY: HAS ANYONE EVER THREATENED TO HURT YOUR FAMILY OR YOUR PETS?: NO

## 2024-11-11 SDOH — SOCIAL STABILITY: SOCIAL NETWORK: IN A TYPICAL WEEK, HOW MANY TIMES DO YOU TALK ON THE PHONE WITH FAMILY, FRIENDS, OR NEIGHBORS?: PATIENT DECLINED

## 2024-11-11 SDOH — SOCIAL STABILITY: SOCIAL INSECURITY: ARE THERE ANY APPARENT SIGNS OF INJURIES/BEHAVIORS THAT COULD BE RELATED TO ABUSE/NEGLECT?: NO

## 2024-11-11 SDOH — SOCIAL STABILITY: SOCIAL NETWORK: HOW OFTEN DO YOU ATTEND MEETINGS OF THE CLUBS OR ORGANIZATIONS YOU BELONG TO?: PATIENT DECLINED

## 2024-11-11 SDOH — SOCIAL STABILITY: SOCIAL NETWORK: HOW OFTEN DO YOU GET TOGETHER WITH FRIENDS OR RELATIVES?: PATIENT DECLINED

## 2024-11-11 SDOH — SOCIAL STABILITY: SOCIAL INSECURITY: WITHIN THE LAST YEAR, HAVE YOU BEEN AFRAID OF YOUR PARTNER OR EX-PARTNER?: NO

## 2024-11-11 SDOH — ECONOMIC STABILITY: TRANSPORTATION INSECURITY: IN THE PAST 12 MONTHS, HAS LACK OF TRANSPORTATION KEPT YOU FROM MEDICAL APPOINTMENTS OR FROM GETTING MEDICATIONS?: NO

## 2024-11-11 SDOH — ECONOMIC STABILITY: HOUSING INSECURITY: IN THE LAST 12 MONTHS, WAS THERE A TIME WHEN YOU WERE NOT ABLE TO PAY THE MORTGAGE OR RENT ON TIME?: NO

## 2024-11-11 SDOH — ECONOMIC STABILITY: FOOD INSECURITY: WITHIN THE PAST 12 MONTHS, THE FOOD YOU BOUGHT JUST DIDN'T LAST AND YOU DIDN'T HAVE MONEY TO GET MORE.: NEVER TRUE

## 2024-11-11 SDOH — SOCIAL STABILITY: SOCIAL NETWORK: HOW OFTEN DO YOU ATTEND CHURCH OR RELIGIOUS SERVICES?: PATIENT DECLINED

## 2024-11-11 SDOH — SOCIAL STABILITY: SOCIAL INSECURITY: ARE YOU MARRIED, WIDOWED, DIVORCED, SEPARATED, NEVER MARRIED, OR LIVING WITH A PARTNER?: PATIENT DECLINED

## 2024-11-11 SDOH — SOCIAL STABILITY: SOCIAL INSECURITY: ARE YOU OR HAVE YOU BEEN THREATENED OR ABUSED PHYSICALLY, EMOTIONALLY, OR SEXUALLY BY ANYONE?: NO

## 2024-11-11 SDOH — SOCIAL STABILITY: SOCIAL INSECURITY: HAVE YOU HAD ANY THOUGHTS OF HARMING ANYONE ELSE?: NO

## 2024-11-11 SDOH — SOCIAL STABILITY: SOCIAL INSECURITY: DO YOU FEEL ANYONE HAS EXPLOITED OR TAKEN ADVANTAGE OF YOU FINANCIALLY OR OF YOUR PERSONAL PROPERTY?: NO

## 2024-11-11 SDOH — SOCIAL STABILITY: SOCIAL INSECURITY: HAVE YOU HAD THOUGHTS OF HARMING ANYONE ELSE?: NO

## 2024-11-11 SDOH — SOCIAL STABILITY: SOCIAL INSECURITY: DOES ANYONE TRY TO KEEP YOU FROM HAVING/CONTACTING OTHER FRIENDS OR DOING THINGS OUTSIDE YOUR HOME?: NO

## 2024-11-11 SDOH — HEALTH STABILITY: PHYSICAL HEALTH: ON AVERAGE, HOW MANY MINUTES DO YOU ENGAGE IN EXERCISE AT THIS LEVEL?: PATIENT DECLINED

## 2024-11-11 SDOH — SOCIAL STABILITY: SOCIAL INSECURITY: DO YOU FEEL UNSAFE GOING BACK TO THE PLACE WHERE YOU ARE LIVING?: NO

## 2024-11-11 ASSESSMENT — ACTIVITIES OF DAILY LIVING (ADL)
LACK_OF_TRANSPORTATION: NO
HEARING - RIGHT EAR: FUNCTIONAL
PATIENT'S MEMORY ADEQUATE TO SAFELY COMPLETE DAILY ACTIVITIES?: YES
BATHING: INDEPENDENT
ADEQUATE_TO_COMPLETE_ADL: YES
HEARING - LEFT EAR: FUNCTIONAL
DRESSING YOURSELF: INDEPENDENT
FEEDING YOURSELF: INDEPENDENT
GROOMING: INDEPENDENT
JUDGMENT_ADEQUATE_SAFELY_COMPLETE_DAILY_ACTIVITIES: YES
TOILETING: INDEPENDENT
WALKS IN HOME: INDEPENDENT

## 2024-11-11 ASSESSMENT — PAIN SCALES - GENERAL
PAINLEVEL_OUTOF10: 5 - MODERATE PAIN
PAINLEVEL_OUTOF10: 4
PAINLEVEL_OUTOF10: 10 - WORST POSSIBLE PAIN
PAINLEVEL_OUTOF10: 4
PAINLEVEL_OUTOF10: 7
PAINLEVEL_OUTOF10: 1
PAINLEVEL_OUTOF10: 2
PAINLEVEL_OUTOF10: 8
PAINLEVEL_OUTOF10: 5 - MODERATE PAIN
PAINLEVEL_OUTOF10: 10 - WORST POSSIBLE PAIN
PAINLEVEL_OUTOF10: 8

## 2024-11-11 ASSESSMENT — LIFESTYLE VARIABLES
HOW MANY STANDARD DRINKS CONTAINING ALCOHOL DO YOU HAVE ON A TYPICAL DAY: PATIENT DOES NOT DRINK
PRESCIPTION_ABUSE_PAST_12_MONTHS: NO
HOW OFTEN DO YOU HAVE 6 OR MORE DRINKS ON ONE OCCASION: NEVER
HOW OFTEN DO YOU HAVE A DRINK CONTAINING ALCOHOL: NEVER
AUDIT-C TOTAL SCORE: 0
AUDIT-C TOTAL SCORE: 0
SKIP TO QUESTIONS 9-10: 1
SUBSTANCE_ABUSE_PAST_12_MONTHS: NO
AUDIT-C TOTAL SCORE: 0
SKIP TO QUESTIONS 9-10: 1

## 2024-11-11 ASSESSMENT — COGNITIVE AND FUNCTIONAL STATUS - GENERAL
DRESSING REGULAR UPPER BODY CLOTHING: A LITTLE
TOILETING: A LITTLE
DAILY ACTIVITIY SCORE: 19
STANDING UP FROM CHAIR USING ARMS: A LITTLE
WALKING IN HOSPITAL ROOM: A LITTLE
DRESSING REGULAR LOWER BODY CLOTHING: A LITTLE
HELP NEEDED FOR BATHING: A LITTLE
MOBILITY SCORE: 21
PERSONAL GROOMING: A LITTLE
CLIMB 3 TO 5 STEPS WITH RAILING: A LITTLE
PATIENT BASELINE BEDBOUND: NO

## 2024-11-11 ASSESSMENT — PAIN SCALES - PAIN ASSESSMENT IN ADVANCED DEMENTIA (PAINAD)
TOTALSCORE: MEDICATION (SEE MAR)
TOTALSCORE: MEDICATION (SEE MAR);COLD APPLIED
TOTALSCORE: MEDICATION (SEE MAR)
TOTALSCORE: MEDICATION (SEE MAR)
TOTALSCORE: COLD APPLIED
TOTALSCORE: MEDICATION (SEE MAR)

## 2024-11-11 ASSESSMENT — PAIN DESCRIPTION - LOCATION
LOCATION: KNEE

## 2024-11-11 ASSESSMENT — COLUMBIA-SUICIDE SEVERITY RATING SCALE - C-SSRS
1. IN THE PAST MONTH, HAVE YOU WISHED YOU WERE DEAD OR WISHED YOU COULD GO TO SLEEP AND NOT WAKE UP?: NO
2. HAVE YOU ACTUALLY HAD ANY THOUGHTS OF KILLING YOURSELF?: NO
6. HAVE YOU EVER DONE ANYTHING, STARTED TO DO ANYTHING, OR PREPARED TO DO ANYTHING TO END YOUR LIFE?: NO

## 2024-11-11 ASSESSMENT — PAIN - FUNCTIONAL ASSESSMENT
PAIN_FUNCTIONAL_ASSESSMENT: 0-10
PAIN_FUNCTIONAL_ASSESSMENT: WONG-BAKER FACES
PAIN_FUNCTIONAL_ASSESSMENT: 0-10
PAIN_FUNCTIONAL_ASSESSMENT: 0-10

## 2024-11-11 ASSESSMENT — PAIN DESCRIPTION - DESCRIPTORS
DESCRIPTORS: ACHING
DESCRIPTORS: ACHING;DISCOMFORT
DESCRIPTORS: ACHING;DULL

## 2024-11-11 ASSESSMENT — PAIN DESCRIPTION - ORIENTATION
ORIENTATION: RIGHT

## 2024-11-11 ASSESSMENT — PAIN SCALES - WONG BAKER: WONGBAKER_NUMERICALRESPONSE: NO HURT

## 2024-11-11 NOTE — OP NOTE
OPEN TOTAL KNEE ARTHROPLASTY  [address] - CA ARTHRP KNE CONDYLE&PLATU MEDIAL&LAT COMPARTMENTS   Operative Note     Date: 2024  OR Location: [facility]    Name: Stacey Dyer, : 1965, Age: 59 y.o., MRN: [id number], Sex: female    PRE Diagnosis  Right knee osteoarthritis    POST Diagnosis  Same    Procedures  Right total knee arthroplasty subvastus approach    Surgeons      * Rudy Mack - Primary    Resident/Fellow/Other Assistant:  star Olguin dawn    Procedure Summary  Implants:  Styker Cemented Triathlon Total Knee: Femur size 4 posterior stabilized, size 4 universal tibial baseplate, size A32 N2Vac Patella, size 4x11 posterior stabilized X3 tibial bearing insert     Anesthesia: General LMA with adductor canal block  ASA: III  Anesthesia Staff:   Anesthesiologist: Serge Carcamo DO  CRNA: Frances Swan APRN-CRNA, MARII  C-AA: ELIDA Flores  Estimated Blood Loss: 50 mL    Specimen: No specimens collected     Staff:   Circulator: Marbella Baptiste RN  Relief Circulator: Rina Rodriguez, SCOT; Ada Vega RN  Relief Scrub: Ben Mejia  Scrub Person: Madhuri Devine; Lilly Isaac         Drains and/or Catheters:   2 Hemovac    Tourniquet Times:     Total Tourniquet Time Documented:  Thigh (Left) - 57 minutes  Total: Thigh (Left) - 57 minutes             Findings: Preoperative exam revealed knee hyperextension, varus valgus instability 10 degrees in each direction with firm endpoint.  Mechanical clunking palpated going from varus to neutral position.  Severe degenerative change of the medial joint space.  Ligamentous laxity.  Stable implantation of the above components.  Neutral alignment stable in extension to an axial loading exam.  Patella tracked normally through a subvastus approach.  There were multiple loose bodies along the posterior and posterior medial joint recess which were excised.  Large osteophytes along the posterior and medial  aspect.    Indications: Stacey Dyer is an 59 y.o. female who is having surgery for right knee osteoarthritis.   Patient presented with pain and symptoms of her right knee.  Pain in addition to instability causing difficulty with daily activities.  She had reached a point of disability.  X-rays revealed severe degenerative changes with varus alignment.  Clinical exam revealed ligamentous laxity.  I discussed with her further options after conservative treatment had failed she wished to proceed with a knee replacement surgery.  I discussed with the patient the risks, benefits, alternatives, complications of a total knee replacement.  The risks, including but not limited to, deep vein thrombosis, pulmonary embolism, infection, knee stiffness, periprosthetic fracture, damage to ligaments tendons or neurovascular structures was discussed.  Despite these risks they elected to proceed.      Procedure Details: Medications:  Antibiotic Ancef 2 g IV,Tranexamic acid 2 g IV, Decadron 10 milligrams IV    Patient was seen and evaluated in the preoperative area and the right leg was marked as the operative extremity.  They were then brought back to the operating room after anesthesia administered an adductor canal block.  Patient was laid supine on the table and anesthesia team controlled the head neck and airway administered general anesthetic.   The well leg was wrapped and secured to the table.  The operative leg was prepped with a well-padded thigh tourniquet.  We then washed the skin with a chlorhexidine wash and alcohol.  We then prepped with a ChloraPrep and draped in the usual fashion.  An operative time-out was performed.  I then exsanguinated the leg with an Esmarch and elevated the tourniquet to 250 millimeters of mercury.      I marked out the anterior landmarks of the knee and a midline incision.  I created the incision with the scalpel and used the Bovie to dissect down through the subcutaneous tissue to the  level of the extensor mechanism.  I created full-thickness skin flaps medial and laterally.  Identified the VMO and placed a Hohmann retractor deep to this to retract the extensor mechanism laterally.  Marked out the subvastus approach.  I created this with the scalpel.  I then used the Bovie to dissect a subperiosteal dissection off the proximal medial tibia.  I removed the infrapatellar fat pad.  I released the lateral synovium and was able to flex the knee and translate the patella laterally.  Patient had severe wear of the medial compartment.  I marked out Whitesides line and debrided the anterior cruciate ligament.  I debrided the intercondylar notch and used a drill to enter the femoral canal.  I then used a flexible intramedullary alignment guide with distal cutting jig set for 5 degree valgus with an 8 millimeter resection.  I set this on the more prominent medial condyle.  I pinned this into position and protected the soft tissues while I  resected the distal femur.  My cut measured at 7 millimeters.   I now turned my attention to the tibia.  I used a extramedullary guide centered in the tibial spines.  I aligned it with respect to the medial 3rd of the tubercle, to tibial crest, and centered over talus.  I adjusted for a neutral slope.  I pinned the block in position with  a 2 millimeter resection off the medial side.  I then used a drop gauri as a secondary check.  I then protected the soft tissues and resected the proximal tibia.  I measured the lateral thickness at 8 millimeters.  I now brought the knee into extension and used the gap balancing block.  I was satisfied with the alignment and the extension gap -there was laxity and the size 11 seemed to fill the extension space.  Now returned to the femur using the 3 degree external rotation sizing guide and pinned this into position  with respect to Whitesides line, epicondylar axis and a tibial cut.  I measured the femur at a size 4-5.  The size 5 femoral  trial was grossly oversized along the distal femur.  I selected the size 4 cutting block.  I placed the 4 in 1 cutting block and pinned this down.  I then resected the anterior and posterior condyles and chamfers.  I then cleaned the bone cuts with a rongeur and an osteotome.  I now placed the box cutting guide in line with the lateral border of the femur.  I pinned this into position then used a distal chisel and oscillating saw to resect the box and removed the PCL with the Bovie.  I now clean the posterior aspect of the knee with the Bovie removing the medial and lateral meniscus in their entirety.  I preserved the popliteus tendon.  I then took a curved osteotome and resected the posterior condylar osteophytes bilaterally  decompressing the posterior space.  I provisionally sized the tibia to a size 4. I did a trial reduction with the femur, tibia and a 11 millimeter insert.   I brought the knee through range of motion was satisfied with the tracking and varus valgus stability throughout.  The native patella tracked normally.  I everted the patella with 2 towel clips.  I circumferentially burned around this with the Bovie.    The patella measured 18 in thickness. I took a freehand resection down to 12 millimeters of bone circumferentially.   I sized an asymmetric to.  I alligned the drill guide with the medial border and drilled for the cemented component.  I then placed a trial and tested the kinematics and the patella tracked normally through the sub vastus approach.  I now removed the trial components and finished my tibial preparation.  I pinned the base plate into position and then used the boss reamer, keel punch and an oversized keel punched for the cemented technique.  I now removed this and thoroughly irrigated the soft tissue and bone in preparation for implantation.  I used standard cementation technique.  I flexed the knee and translated the tibia forward.  I finger packed the cement into the  keel/stem and then seated the tibial component reducing the removing the extra cement.  I then finger packed cement onto the distal femur seated the femoral component and placed the trial polyethylene as the cement cured.  I also irrigated clean and dried the patella seated the patellar component. After cement had thoroughly cured I tested the kinematics with the size 11 millimeter insert and was satisfied with the tracking, alignment, and stability of the knee.  I removed this insert trial, cleaned the locking mechanism and seated the posterior stabilized tibial bearing insert.  The fit was secure medial and laterally.  I extended the knee and irrigated with Betadine.  I washed this out with normal saline.  I packed the need and dropped the tourniquet to obtain hemostasis. I then placed 1 intra-articular drain and closed the arthrotomy in a semi flexed position with #2 Ethibond for a watertight seal tested through range of motion.  I then placed an extra-articular drain, irrigated and closed in a layered fashion of running 0 Vicryl, buried interrupted 2-0 Vicryl, running 4-0 Monocryl.  Dermabond and a silver dressing were applied.  The patient tolerated procedure well and was awakened taken to PACU in stable condition.  No complications encountered.  Complications:  None; patient tolerated the procedure well.    Disposition: PACU - hemodynamically stable.  Condition: stable             Attending Attestation: I performed the procedure.    Rudy Mack  Phone Number: [phone]

## 2024-11-11 NOTE — CARE PLAN
The patient's goals for the shift include      The clinical goals for the shift include pain control    Over the shift, the patient did not make progress toward the following goals. Barriers to progression include pain control. Recommendations to address these barriers include pain.

## 2024-11-11 NOTE — ANESTHESIA PROCEDURE NOTES
Airway  Date/Time: 11/11/2024 10:25 AM  Urgency: elective    Airway not difficult    Staffing  Performed: CRNA   Authorized by: Serge Carcamo DO    Performed by: FARIBA Jackson-CRNA, MARII  Patient location during procedure: OR    Indications and Patient Condition  Indications for airway management: anesthesia and airway protection  Spontaneous Ventilation: absent  Sedation level: deep  Preoxygenated: yes  Patient position: sniffing  Mask difficulty assessment: 1 - vent by mask  Planned trial extubation    Final Airway Details  Final airway type: supraglottic airway      Successful airway: classic  Size 4     Number of attempts at approach: 1

## 2024-11-11 NOTE — CARE PLAN
The patient's goals for the shift include      The clinical goals for the shift include      Over the shift, the patient did not make progress toward the following goals. Barriers to progression include pain control. Recommendations to address these barriers include pain.

## 2024-11-11 NOTE — CONSULTS
Inpatient consult to Medicine  Consult performed by: Cher Willis, FARIBA-CNP  Consult ordered by: Rudy Mack MD          Reason For Consult  Medical management asthma, hypercholesterolemia    History Of Present Illness  Stacey Dyer is a 59 y.o. female with a past medical history of asthma, carcinoid tumor s/p appy, intestinal resection in 2018 at [facility], fatty liver, hypercholesterolemia, and osteoarthritis who presented to [facility] for an anticipated right total knee arthroplasty. Patient had been having knee pain for 15 years however became much worse in the last two years, interfering with ADLs. Underwent right total knee arthroplasty subvastus approach by Dr. Mack on 11/11/24. Denies chest pain, shortness of breath, fevers, chills, nausea, or vomiting. No abdominal discomfort. Denies lightheadedness or dizziness. No dysuria. Medicine consulted to manage acute or chronic medical conditions.      Past Medical History  She has a past medical history of Arthropathy (01/12/2024), Asthma, Carcinoid tumor (09/12/2023), DUB (dysfunctional uterine bleeding) (04/26/2011), Fatty liver (09/12/2023), Primary osteoarthritis involving multiple joints (01/12/2024), Pure hypercholesterolemia (09/12/2023), Right thyroid nodule (08/19/2018), Status post partial colectomy (04/11/2018), Temporomandibular joint syndrome (09/12/2023), and Thyroid nodule.    Surgical History  She has a past surgical history that includes Cholecystectomy; Appendectomy; Colectomy (2018); and Knee Arthroplasty (Left).     Social History  She reports that she quit smoking about 24 years ago. Her smoking use included cigarettes. She started smoking about 34 years ago. She has a 5 pack-year smoking history. She has never used smokeless tobacco. She reports that she does not currently use alcohol. She reports that she does not use drugs.    Family History  Family History   Problem Relation Name Age of Onset    No Known Problems Mother      No  Known Problems Father          Allergies  Bee venom protein (honey bee) and Azithromycin    Review of Systems  As per HPI. At least 10 systems are reviewed and are otherwise negative.      Physical Exam  General: Well developed. NAD.  HEENT: PERRL. EOMI. Pink conjunctiva. Trachea midline.  Cardiovascular: S1, S2. RRR. No edema.  Respiratory: Breath sounds clear bilaterally, posterior anterior chest. No cyanosis. No hypoxia.  GI: Abdomen soft, nontender. Bowel sounds present in all quadrants .   : No bladder distention.  MS: Right surgical knee  Neuro: Alert, oriented. No numbness or paresthesia.   Skin: Warm, dry. Surgical dressing intact with drain in place       Last Recorded Vitals  BP (!) 141/95 Comment: moving arm  Pulse 68   Temp 36 °C (96.8 °F)   Resp 14   Wt 87.5 kg (192 lb 14.4 oz)   SpO2 94%     Relevant Results  Lab Results   Component Value Date    GLUCOSE 100 (H) 10/30/2024    CALCIUM 9.4 10/30/2024     10/30/2024    K 3.9 10/30/2024    CO2 27 10/30/2024     10/30/2024    BUN 12 10/30/2024    CREATININE 0.79 10/30/2024      Lab Results   Component Value Date    WBC 5.9 10/30/2024    HGB 14.2 10/30/2024    HCT 41.5 10/30/2024    MCV 99 10/30/2024     10/30/2024   XR knee right 1-2 views  Result Date: 11/11/2024  Satisfactory postoperative examination.   MACRO: None   Signed by: Jaime Graves 11/11/2024 1:58 PM Dictation workstation:   [id number]       Assessment/Plan     Asthma  Monitor pulse ox  PRN duonebs  Continue Advair    Right knee osteoarthritis  S/p right total knee arthroplasty by Dr. Mack on 11/11/24  Pain management  Bowel regimen-may need to hold due to chronic absorption issues following multiple ABD surgeries  Encourage incentive spirometer  PT/OT/out of bed  DVT prophylaxis    History of carcinoid tumor  S/p appendectomy, intestinal resection, lani  Patient takes Questran twice daily for absorption-resume    Hypercholesteremia  Resume home  medications    Plan  Resume home medications  Pain management/bowel regimen-patient refused Miralax, agreeable to Colace PRN, has chronic malabsorption  Encourage IS  DVT prophylaxis  CBC and BMP in AM  PT/OT  Orthopedic surgery primary    Thank you so much for this consult. Will continue to follow.         Cher Willis, APRN-CNP

## 2024-11-11 NOTE — ANESTHESIA PREPROCEDURE EVALUATION
Patient: Stacey Dyer    Procedure Information       Date/Time: 11/11/24 1015    Procedure: OPEN TOTAL KNEE ARTHROPLASTY (Right: Knee)    Location: [facility] OR 08 / Virtual [facility]    Surgeons: Rudy Mack MD          Past Medical History:   Diagnosis Date    Arthropathy 01/12/2024    Asthma     Carcinoid tumor 09/12/2023 4/18 appy, intestinal resection.   [facility]    DUB (dysfunctional uterine bleeding) 04/26/2011    Fatty liver 09/12/2023    Hep C/B, iron,ferritin,antismooth muscle abs neg. 2/21 ( liver OK on previous CT scn)    Primary osteoarthritis involving multiple joints 01/12/2024    B knee arthritis 7/23 , Frames rec.    Pure hypercholesterolemia 09/12/2023    Right thyroid nodule 08/19/2018    Status post partial colectomy 04/11/2018    Temporomandibular joint syndrome 09/12/2023    Thyroid nodule      8/18 Bx neg. 7/23 [facility] released,TSH nl     Past Surgical History:   Procedure Laterality Date    APPENDECTOMY      CHOLECYSTECTOMY      COLECTOMY  2018    partial    KNEE ARTHROPLASTY Left        Relevant Problems   Anesthesia (within normal limits)      Pulmonary   (+) Asthma      Neuro   (+) Anxiety      GI  Hx carcinoid tumor, s/p appy, partial cholectomy.  Chronic loose stools      Liver   (+) Steatosis of liver      Endocrine   (+) Class 1 obesity in adult      Musculoskeletal   (+) Osteoarthritis   (+) Primary osteoarthritis involving multiple joints       Clinical information reviewed:   Tobacco  Allergies  Meds   Med Hx  Surg Hx  OB Status  Fam Hx  Soc   Hx        NPO Detail:  NPO/Void Status  Date of Last Liquid: 11/10/24  Time of Last Liquid: 2359  Date of Last Solid: 11/10/24  Time of Last Solid: 2359  Last Intake Type: Clear fluids  Time of Last Void: 0800         Physical Exam    Airway  Mallampati: II  TM distance: >3 FB  Neck ROM: full     Cardiovascular   Comments: deferred   Dental   Comments: No loose teeth   Pulmonary   Comments: deferred   Abdominal      Comments: deferred           Anesthesia Plan    History of general anesthesia?: yes  History of complications of general anesthesia?: no    ASA 3     general and regional     The patient is not a current smoker.  Patient was not previously instructed to abstain from smoking on day of procedure.  Patient did not smoke on day of procedure.  Education provided regarding risk of obstructive sleep apnea.  intravenous induction   Postoperative administration of opioids is intended.  Anesthetic plan and risks discussed with patient.  Use of blood products discussed with patient who consented to blood products.    Plan discussed with CRNA and CAA.

## 2024-11-11 NOTE — PERIOPERATIVE NURSING NOTE
VSS, good pain control now 3/10 down from 10/10 upon pacu admit. Medicated for severe pain in PACU, good CMS, dorsi/plantar flextion RLE, dressing remain stable c/d/I, HMVC patent compressed, sang.  Jules, updated hourly. SBAR to Kennedi Wait, awaiting room to be clean for transfer.

## 2024-11-11 NOTE — ANESTHESIA PROCEDURE NOTES
Peripheral Block    Patient location during procedure: pre-op  Start time: 11/11/2024 9:59 AM  End time: 11/11/2024 10:01 AM  Reason for block: at surgeon's request and post-op pain management  Staffing  Performed: attending   Authorized by: Serge Carcamo DO    Performed by: Serge Carcamo DO  Preanesthetic Checklist  Completed: patient identified, IV checked, site marked, risks and benefits discussed, surgical consent, monitors and equipment checked, pre-op evaluation and timeout performed   Timeout performed at: 11/11/2024 9:55 AM  Peripheral Block  Patient position: laying flat  Prep: ChloraPrep  Patient monitoring: heart rate, cardiac monitor and continuous pulse ox  Block type: adductor canal  Laterality: right  Injection technique: single-shot  Guidance: ultrasound guided  Local infiltration: ropivacaine  Infiltration strength: 0.5 %  Dose: 30 mL  Needle  Needle gauge: 22 G  Needle length: 10 cm  Needle localization: ultrasound guidance     image stored in chart  Assessment  Injection assessment: negative aspiration for heme, no paresthesia on injection and incremental injection  Paresthesia pain: none  Heart rate change: no  Slow fractionated injection: yes  Additional Notes  With 4mg decadron

## 2024-11-11 NOTE — ANESTHESIA POSTPROCEDURE EVALUATION
Patient: Stacey Dyer    Procedure Summary       Date: 11/11/24 Room / Location: [facility] OR 08 / Virtual [facility]    Anesthesia Start: 1018 Anesthesia Stop: 1234    Procedure: OPEN TOTAL KNEE ARTHROPLASTY (Right: Knee) Diagnosis:       Primary osteoarthritis of right knee      (right knee osteoarthritis)    Surgeons: Rudy Mack MD Responsible Provider: Serge Carcamo DO    Anesthesia Type: general, regional ASA Status: 3            Anesthesia Type: general, regional    Vitals Value Taken Time   /90 11/11/24 1246   Temp 36.1 °C (97 °F) 11/11/24 1230   Pulse 76 11/11/24 1249   Resp 28 11/11/24 1249   SpO2 100 % 11/11/24 1249   Vitals shown include unfiled device data.    Anesthesia Post Evaluation    Patient location during evaluation: bedside  Patient participation: complete - patient participated  Level of consciousness: awake and alert  Pain management: adequate  Multimodal analgesia pain management approach  Airway patency: patent  Two or more strategies used to mitigate risk of obstructive sleep apnea  Cardiovascular status: acceptable  Respiratory status: acceptable  Hydration status: acceptable  Postoperative Nausea and Vomiting: none        There were no known notable events for this encounter.

## 2024-11-11 NOTE — NURSING NOTE
Received pt from pacu via bed,  at bedside. IS given and instructed scd's on. Oriented to room call system etc

## 2024-11-12 LAB
ANION GAP SERPL CALCULATED.3IONS-SCNC: 11 MMOL/L (ref 10–20)
BUN SERPL-MCNC: 15 MG/DL (ref 6–23)
CALCIUM SERPL-MCNC: 8.6 MG/DL (ref 8.6–10.3)
CHLORIDE SERPL-SCNC: 106 MMOL/L (ref 98–107)
CO2 SERPL-SCNC: 22 MMOL/L (ref 21–32)
CREAT SERPL-MCNC: 0.81 MG/DL (ref 0.5–1.05)
EGFRCR SERPLBLD CKD-EPI 2021: 83 ML/MIN/1.73M*2
ERYTHROCYTE [DISTWIDTH] IN BLOOD BY AUTOMATED COUNT: 12.8 % (ref 11.5–14.5)
GLUCOSE SERPL-MCNC: 143 MG/DL (ref 74–99)
HCT VFR BLD AUTO: 37.6 % (ref 36–46)
HGB BLD-MCNC: 12.6 G/DL (ref 12–16)
MCH RBC QN AUTO: 33.6 PG (ref 26–34)
MCHC RBC AUTO-ENTMCNC: 33.5 G/DL (ref 32–36)
MCV RBC AUTO: 100 FL (ref 80–100)
NRBC BLD-RTO: 0 /100 WBCS (ref 0–0)
PLATELET # BLD AUTO: 263 X10*3/UL (ref 150–450)
POTASSIUM SERPL-SCNC: 4 MMOL/L (ref 3.5–5.3)
RBC # BLD AUTO: 3.75 X10*6/UL (ref 4–5.2)
SODIUM SERPL-SCNC: 135 MMOL/L (ref 136–145)
WBC # BLD AUTO: 12.3 X10*3/UL (ref 4.4–11.3)

## 2024-11-12 PROCEDURE — 97161 PT EVAL LOW COMPLEX 20 MIN: CPT | Mod: GP | Performed by: PHYSICAL THERAPIST

## 2024-11-12 PROCEDURE — 94640 AIRWAY INHALATION TREATMENT: CPT

## 2024-11-12 PROCEDURE — 99232 SBSQ HOSP IP/OBS MODERATE 35: CPT | Performed by: NURSE PRACTITIONER

## 2024-11-12 PROCEDURE — 80048 BASIC METABOLIC PNL TOTAL CA: CPT | Performed by: ORTHOPAEDIC SURGERY

## 2024-11-12 PROCEDURE — 9420000001 HC RT PATIENT EDUCATION 5 MIN

## 2024-11-12 PROCEDURE — 36415 COLL VENOUS BLD VENIPUNCTURE: CPT | Performed by: ORTHOPAEDIC SURGERY

## 2024-11-12 PROCEDURE — 2500000001 HC RX 250 WO HCPCS SELF ADMINISTERED DRUGS (ALT 637 FOR MEDICARE OP): Performed by: NURSE PRACTITIONER

## 2024-11-12 PROCEDURE — 7100000011 HC EXTENDED STAY RECOVERY HOURLY - NURSING UNIT

## 2024-11-12 PROCEDURE — 97535 SELF CARE MNGMENT TRAINING: CPT | Mod: GO

## 2024-11-12 PROCEDURE — 97165 OT EVAL LOW COMPLEX 30 MIN: CPT | Mod: GO

## 2024-11-12 PROCEDURE — 97530 THERAPEUTIC ACTIVITIES: CPT | Mod: GO

## 2024-11-12 PROCEDURE — 85027 COMPLETE CBC AUTOMATED: CPT | Performed by: ORTHOPAEDIC SURGERY

## 2024-11-12 PROCEDURE — 97530 THERAPEUTIC ACTIVITIES: CPT | Mod: GP | Performed by: PHYSICAL THERAPIST

## 2024-11-12 PROCEDURE — 2500000004 HC RX 250 GENERAL PHARMACY W/ HCPCS (ALT 636 FOR OP/ED): Performed by: ORTHOPAEDIC SURGERY

## 2024-11-12 PROCEDURE — 2500000001 HC RX 250 WO HCPCS SELF ADMINISTERED DRUGS (ALT 637 FOR MEDICARE OP): Performed by: ORTHOPAEDIC SURGERY

## 2024-11-12 RX ORDER — ACETAMINOPHEN 500 MG
1000 TABLET ORAL EVERY 6 HOURS PRN
Qty: 120 TABLET | Refills: 0 | Status: SHIPPED | OUTPATIENT
Start: 2024-11-12 | End: 2024-11-28

## 2024-11-12 RX ORDER — DOCUSATE SODIUM 100 MG/1
100 CAPSULE, LIQUID FILLED ORAL 2 TIMES DAILY
Qty: 30 CAPSULE | Refills: 0 | Status: SHIPPED | OUTPATIENT
Start: 2024-11-12 | End: 2024-11-28

## 2024-11-12 RX ORDER — OXYCODONE HYDROCHLORIDE 5 MG/1
5 TABLET ORAL EVERY 4 HOURS PRN
Qty: 40 TABLET | Refills: 0 | Status: SHIPPED | OUTPATIENT
Start: 2024-11-12 | End: 2024-11-20

## 2024-11-12 RX ORDER — IBUPROFEN 600 MG/1
600 TABLET ORAL EVERY 6 HOURS PRN
Qty: 20 TABLET | Refills: 0 | Status: SHIPPED | OUTPATIENT
Start: 2024-11-12 | End: 2024-11-18

## 2024-11-12 RX ORDER — ASPIRIN 81 MG/1
81 TABLET ORAL 2 TIMES DAILY
Qty: 56 TABLET | Refills: 0 | Status: SHIPPED | OUTPATIENT
Start: 2024-11-12 | End: 2024-12-11

## 2024-11-12 SDOH — SOCIAL STABILITY: SOCIAL INSECURITY: WITHIN THE LAST YEAR, HAVE YOU BEEN HUMILIATED OR EMOTIONALLY ABUSED IN OTHER WAYS BY YOUR PARTNER OR EX-PARTNER?: NO

## 2024-11-12 SDOH — ECONOMIC STABILITY: INCOME INSECURITY: IN THE PAST 12 MONTHS HAS THE ELECTRIC, GAS, OIL, OR WATER COMPANY THREATENED TO SHUT OFF SERVICES IN YOUR HOME?: NO

## 2024-11-12 SDOH — ECONOMIC STABILITY: HOUSING INSECURITY: AT ANY TIME IN THE PAST 12 MONTHS, WERE YOU HOMELESS OR LIVING IN A SHELTER (INCLUDING NOW)?: NO

## 2024-11-12 SDOH — SOCIAL STABILITY: SOCIAL NETWORK
IN A TYPICAL WEEK, HOW MANY TIMES DO YOU TALK ON THE PHONE WITH FAMILY, FRIENDS, OR NEIGHBORS?: MORE THAN THREE TIMES A WEEK

## 2024-11-12 SDOH — SOCIAL STABILITY: SOCIAL INSECURITY: WITHIN THE LAST YEAR, HAVE YOU BEEN AFRAID OF YOUR PARTNER OR EX-PARTNER?: NO

## 2024-11-12 SDOH — ECONOMIC STABILITY: HOUSING INSECURITY: IN THE PAST 12 MONTHS, HOW MANY TIMES HAVE YOU MOVED WHERE YOU WERE LIVING?: 0

## 2024-11-12 SDOH — HEALTH STABILITY: PHYSICAL HEALTH: ON AVERAGE, HOW MANY MINUTES DO YOU ENGAGE IN EXERCISE AT THIS LEVEL?: 30 MIN

## 2024-11-12 SDOH — SOCIAL STABILITY: SOCIAL NETWORK: HOW OFTEN DO YOU GET TOGETHER WITH FRIENDS OR RELATIVES?: TWICE A WEEK

## 2024-11-12 SDOH — HEALTH STABILITY: MENTAL HEALTH: HOW OFTEN DO YOU HAVE A DRINK CONTAINING ALCOHOL?: NEVER

## 2024-11-12 SDOH — ECONOMIC STABILITY: FOOD INSECURITY: WITHIN THE PAST 12 MONTHS, YOU WORRIED THAT YOUR FOOD WOULD RUN OUT BEFORE YOU GOT THE MONEY TO BUY MORE.: NEVER TRUE

## 2024-11-12 SDOH — ECONOMIC STABILITY: TRANSPORTATION INSECURITY: IN THE PAST 12 MONTHS, HAS LACK OF TRANSPORTATION KEPT YOU FROM MEDICAL APPOINTMENTS OR FROM GETTING MEDICATIONS?: NO

## 2024-11-12 SDOH — HEALTH STABILITY: MENTAL HEALTH: HOW OFTEN DO YOU HAVE SIX OR MORE DRINKS ON ONE OCCASION?: NEVER

## 2024-11-12 SDOH — ECONOMIC STABILITY: HOUSING INSECURITY: IN THE LAST 12 MONTHS, WAS THERE A TIME WHEN YOU WERE NOT ABLE TO PAY THE MORTGAGE OR RENT ON TIME?: NO

## 2024-11-12 SDOH — ECONOMIC STABILITY: FOOD INSECURITY: WITHIN THE PAST 12 MONTHS, THE FOOD YOU BOUGHT JUST DIDN'T LAST AND YOU DIDN'T HAVE MONEY TO GET MORE.: NEVER TRUE

## 2024-11-12 SDOH — HEALTH STABILITY: PHYSICAL HEALTH: ON AVERAGE, HOW MANY DAYS PER WEEK DO YOU ENGAGE IN MODERATE TO STRENUOUS EXERCISE (LIKE A BRISK WALK)?: 5 DAYS

## 2024-11-12 SDOH — ECONOMIC STABILITY: FOOD INSECURITY: HOW HARD IS IT FOR YOU TO PAY FOR THE VERY BASICS LIKE FOOD, HOUSING, MEDICAL CARE, AND HEATING?: NOT VERY HARD

## 2024-11-12 SDOH — HEALTH STABILITY: MENTAL HEALTH
DO YOU FEEL STRESS - TENSE, RESTLESS, NERVOUS, OR ANXIOUS, OR UNABLE TO SLEEP AT NIGHT BECAUSE YOUR MIND IS TROUBLED ALL THE TIME - THESE DAYS?: ONLY A LITTLE

## 2024-11-12 SDOH — HEALTH STABILITY: MENTAL HEALTH: HOW MANY DRINKS CONTAINING ALCOHOL DO YOU HAVE ON A TYPICAL DAY WHEN YOU ARE DRINKING?: PATIENT DOES NOT DRINK

## 2024-11-12 ASSESSMENT — PAIN DESCRIPTION - DESCRIPTORS
DESCRIPTORS: DISCOMFORT;ACHING
DESCRIPTORS: DISCOMFORT
DESCRIPTORS: ACHING;DISCOMFORT;SPASM
DESCRIPTORS: DISCOMFORT
DESCRIPTORS: SPASM
DESCRIPTORS: DISCOMFORT
DESCRIPTORS: DISCOMFORT

## 2024-11-12 ASSESSMENT — PAIN - FUNCTIONAL ASSESSMENT
PAIN_FUNCTIONAL_ASSESSMENT: 0-10
PAIN_FUNCTIONAL_ASSESSMENT: FLACC (FACE, LEGS, ACTIVITY, CRY, CONSOLABILITY)
PAIN_FUNCTIONAL_ASSESSMENT: 0-10
PAIN_FUNCTIONAL_ASSESSMENT: 0-10
PAIN_FUNCTIONAL_ASSESSMENT: WONG-BAKER FACES
PAIN_FUNCTIONAL_ASSESSMENT: 0-10
PAIN_FUNCTIONAL_ASSESSMENT: WONG-BAKER FACES
PAIN_FUNCTIONAL_ASSESSMENT: 0-10

## 2024-11-12 ASSESSMENT — COGNITIVE AND FUNCTIONAL STATUS - GENERAL
MOBILITY SCORE: 20
MOVING TO AND FROM BED TO CHAIR: A LITTLE
STANDING UP FROM CHAIR USING ARMS: A LITTLE
STANDING UP FROM CHAIR USING ARMS: A LITTLE
MOBILITY SCORE: 20
STANDING UP FROM CHAIR USING ARMS: A LITTLE
WALKING IN HOSPITAL ROOM: A LITTLE
TOILETING: A LITTLE
DRESSING REGULAR LOWER BODY CLOTHING: A LITTLE
STANDING UP FROM CHAIR USING ARMS: A LITTLE
MOVING FROM LYING ON BACK TO SITTING ON SIDE OF FLAT BED WITH BEDRAILS: A LITTLE
CLIMB 3 TO 5 STEPS WITH RAILING: A LITTLE
HELP NEEDED FOR BATHING: A LITTLE
DAILY ACTIVITIY SCORE: 22
DRESSING REGULAR LOWER BODY CLOTHING: A LITTLE
DRESSING REGULAR LOWER BODY CLOTHING: A LITTLE
TOILETING: A LITTLE
TURNING FROM BACK TO SIDE WHILE IN FLAT BAD: A LITTLE
WALKING IN HOSPITAL ROOM: A LITTLE
WALKING IN HOSPITAL ROOM: A LITTLE
DRESSING REGULAR UPPER BODY CLOTHING: A LITTLE
PERSONAL GROOMING: A LITTLE
MOBILITY SCORE: 18
CLIMB 3 TO 5 STEPS WITH RAILING: A LITTLE
DAILY ACTIVITIY SCORE: 22
CLIMB 3 TO 5 STEPS WITH RAILING: A LITTLE
TOILETING: A LITTLE
TURNING FROM BACK TO SIDE WHILE IN FLAT BAD: A LITTLE
MOVING TO AND FROM BED TO CHAIR: A LITTLE
MOVING FROM LYING ON BACK TO SITTING ON SIDE OF FLAT BED WITH BEDRAILS: A LITTLE
DAILY ACTIVITIY SCORE: 19
WALKING IN HOSPITAL ROOM: A LITTLE
MOVING TO AND FROM BED TO CHAIR: A LITTLE
MOVING TO AND FROM BED TO CHAIR: A LITTLE
CLIMB 3 TO 5 STEPS WITH RAILING: A LITTLE
MOBILITY SCORE: 18

## 2024-11-12 ASSESSMENT — PAIN DESCRIPTION - LOCATION
LOCATION: KNEE

## 2024-11-12 ASSESSMENT — ACTIVITIES OF DAILY LIVING (ADL)
LACK_OF_TRANSPORTATION: NO
HOME_MANAGEMENT_TIME_ENTRY: 16
BATHING_ASSISTANCE: MINIMAL
ADL_ASSISTANCE: INDEPENDENT
LACK_OF_TRANSPORTATION: NO
ADL_ASSISTANCE: INDEPENDENT

## 2024-11-12 ASSESSMENT — PAIN SCALES - GENERAL
PAINLEVEL_OUTOF10: 4
PAINLEVEL_OUTOF10: 7
PAINLEVEL_OUTOF10: 6
PAINLEVEL_OUTOF10: 7
PAINLEVEL_OUTOF10: 4
PAINLEVEL_OUTOF10: 7
PAINLEVEL_OUTOF10: 4
PAINLEVEL_OUTOF10: 4
PAINLEVEL_OUTOF10: 7
PAINLEVEL_OUTOF10: 7
PAINLEVEL_OUTOF10: 6
PAINLEVEL_OUTOF10: 5 - MODERATE PAIN
PAINLEVEL_OUTOF10: 4
PAINLEVEL_OUTOF10: 7

## 2024-11-12 ASSESSMENT — LIFESTYLE VARIABLES
SKIP TO QUESTIONS 9-10: 1
AUDIT-C TOTAL SCORE: 0

## 2024-11-12 ASSESSMENT — PAIN SCALES - WONG BAKER
WONGBAKER_NUMERICALRESPONSE: NO HURT
WONGBAKER_NUMERICALRESPONSE: NO HURT
WONGBAKER_NUMERICALRESPONSE: HURTS LITTLE BIT

## 2024-11-12 ASSESSMENT — PAIN DESCRIPTION - ORIENTATION
ORIENTATION: RIGHT

## 2024-11-12 NOTE — DISCHARGE SUMMARY
Discharge Diagnosis  Arthritis of right knee    Issues Requiring Follow-Up  Right total knee    Test Results Pending At Discharge  Pending Labs       No current pending labs.            Hospital Course   Patient brought to the operating room day of admission right total knee replacement.  Uncomplicated surgery admitted to regular nursing floor.  Had some difficulty with mobilization and buckling of the knee.  Did well otherwise progressing with activities planning for home with home health care on postoperative day 2.    Pertinent Physical Exam At Time of Discharge  Physical Exam    Home Medications     Medication List      START taking these medications     aspirin 81 mg EC tablet; Take 1 tablet (81 mg) by mouth 2 times a day   for 28 days.   docusate sodium 100 mg capsule; Commonly known as: Colace; Take 1   capsule (100 mg) by mouth 2 times a day for 15 days.   oxyCODONE 5 mg immediate release tablet; Commonly known as: Roxicodone;   Take 1 tablet (5 mg) by mouth every 4 hours if needed for severe pain (7 -   10) for up to 7 days.     CHANGE how you take these medications     * acetaminophen 325 mg tablet; Commonly known as: Tylenol; What changed:   Another medication with the same name was added. Make sure you understand   how and when to take each.   * acetaminophen 500 mg tablet; Commonly known as: Tylenol; Take 2   tablets (1,000 mg) by mouth every 6 hours if needed for mild pain (1 - 3)   for up to 15 days.; What changed: You were already taking a medication   with the same name, and this prescription was added. Make sure you   understand how and when to take each.   * ibuprofen 200 mg tablet; What changed: Another medication with the   same name was added. Make sure you understand how and when to take each.   * ibuprofen 600 mg tablet; Take 1 tablet (600 mg) by mouth every 6 hours   if needed for mild pain (1 - 3) for up to 5 days.; What changed: You were   already taking a medication with the same name,  and this prescription was   added. Make sure you understand how and when to take each.  * This list has 4 medication(s) that are the same as other medications   prescribed for you. Read the directions carefully, and ask your doctor or   other care provider to review them with you.     CONTINUE taking these medications     Advair Diskus 250-50 mcg/dose diskus inhaler; Generic drug: fluticasone   propion-salmeteroL; INHALE 1 PUFF BY MOUTH TWICE A DAY FOR 90 DAYS *RINSE   MOUTH AFTER USE*   albuterol 90 mcg/actuation inhaler; INHALE 2 PUFFS BY MOUTH EVERY 4   HOURS AS NEEDED FOR 90 DAYS   cholestyramine light 4 gram packet; Commonly known as: Prevalite; USE 1   PACKET 1 TO 2 TIMES A DAY ORALLY AS DIRECTED FOR 90 DAYS TOTAL 30   EpiPen 2-Thomas 0.3 mg/0.3 mL injection syringe; Generic drug: EPINEPHrine       Outpatient Follow-Up  Future Appointments   Date Time Provider Department Center   1/2/2025  2:00 PM Chai Alex MD [id number] Renny Mack MD

## 2024-11-12 NOTE — ASSESSMENT & PLAN NOTE
Asthma  Monitor pulse ox  PRN duonebs  Continue Advair  Stable    Right knee osteoarthritis  S/p right total knee arthroplasty by Dr. Mack on 11/11/24  Pain management  Bowel regimen-may need to hold due to chronic absorption issues following multiple ABD surgeries  Encourage incentive spirometer  PT/OT/out of bed  DVT prophylaxis     History of carcinoid tumor  S/p appendectomy, intestinal resection, lani  Patient takes Questran twice daily for absorption-resume     Hypercholesteremia  Resume home medications    Leukocytosis  Mild, likely reactive to surgery  Will check UA however low suspicion for UTI     Plan  Resume home medications  Pain management/bowel regimen-patient refused Miralax, agreeable to Colace PRN, has chronic malabsorption  Encourage IS  DVT prophylaxis  PT/OT  Orthopedic surgery primary    No absolute contraindication to discharge from medical standpoint.  Patient should follow-up with her primary care physician outpatient.

## 2024-11-12 NOTE — PROGRESS NOTES
"Physical Therapy    Physical Therapy Evaluation & Treatment    Patient Name: Stacey Dyer  MRN: [id number]  Department: [address]  Room: 453/453-A  Today's Date: 11/12/2024   Time Calculation  Start Time: 0923  Stop Time: 0953  Time Calculation (min): 30 min    Assessment/Plan   PT Assessment Results: Decreased strength, Decreased mobility, Decreased range of motion, Pain. Pt endorsed \"weird\" sensation at R knee \"like I don't have full control\" of it to fully extend the joint. She planned to speak with Dr. Mack about this matter.  Rehab Prognosis: Good  Strengths: Support and attitude of living partners, Rehab experience, Premorbid level of function, Living arrangement secure, Housing layout, Attitude of self, Access to adaptive/assistive products  Barriers to Participation: Comorbidities  End of Session Communication: Bedside nurse  Assessment: She presented with the above listed impairments (see Assessment Results). Pt required minimally increased assist/supervision during functional mobility compared to her reported baseline of indep. Pt would benefit from continued skilled PT services for maximizing independence and safety prior to & after discharge (LOW intensity).  End of Session Patient Position: Bed, 3 rail up, Alarm off, not on at start of session (pt agreed tp use call light prior to out of bed mobility. Call light & tray table within reach.)     IP OR SWING BED PT PLAN  Inpatient or Swing Bed: Inpatient    Treatment/Interventions: Bed mobility, Transfer training, Gait training, Strengthening, Therapeutic exercise, Therapeutic activity, Stair training, Range of motion, Home exercise program  PT Plan: Ongoing PT  PT Frequency: BID  PT Discharge Recommendations: Low intensity level of continued care  PT Recommended Transfer Status: Assist x1, Stand by assist, Assistive device  PT - OK to Discharge: Yes      Subjective   General Visit Information:  Reason for Referral: Impaired functional mobility. This 59 " "year old was admitted for planned surgery. She was now s/p R TKA by Dr. Jimenez on 11/11/24.    Past Medical History Relevant to Rehab: asthma, carcinoid tumor s/p appy & partial colectomy, hepatitis c/b liver steatosis, s/p lani, s/p L TKA (April 2024), TMJ syndrome    Family/Caregiver Present: No  Prior to Session Communication: Bedside nurse  Patient Position Received: Bed, 3 rail up, Alarm off, not on at start of session  General Comment: Cleared by RN for participation. Pt agreed to session and was fully engaged throughout despite verbalized concern for reported R knee buckling.    Home Living:  Type of Home: House  Lives With: Spouse  Home Adaptive Equipment:  (straight cane, FWW (here with her), reacher, long handled shoe horn, continuous cold/ice machine.)  Home Layout: Two level, Laundry main level, Able to live on main level with bedroom/bathroom (pt went to 2nd floor)  Home Access:  (1 \"short\" threshold step without grab bar)  Bathroom Shower/Tub: Walk-in shower  Bathroom Toilet: Standard  Bathroom Equipment: Built-in shower seat  Home Living Comments: Spouse worked but will be home on an adjusted work schedule for ~1.5 weeks    Prior Level of Function:  Prior Function Per Pt/Caregiver Report  Receives Help From: Family (spouse)  ADL Assistance: Independent  Homemaking Assistance: Independent  Ambulatory Assistance: Needs assistance (Denied any falls within the past year)  Transfers: Independent  Gait: Independent  Stairs:  (Indep or mod I with rail)  Vocational: Retired  Hand Dominance: Right  Prior Function Comments: (+) driving    Precautions:  Hearing/Visual Limitations:  (reading glasses; hearing WFL)  LE Weight Bearing Status: (RLE WBAT)  Medical Precautions: Fall precautions  Post-Surgical Precautions: Right total knee precautions    R hemovac drain at R knee    Vital Signs (Past 2hrs)        Date/Time Vitals Session Patient Position Pulse Resp SpO2 BP MAP (mmHg)    11/12/24 0844 --  --  69  18  96 % "  146/79  95              Objective   Pain:  0-10 (Numeric) Pain Score: 7  Pain Type: Surgical pain  Pain Location: Knee  Pain Orientation: Right  Pain Interventions: Cold applied (pt reported recently taking pain med (at 09:15 per EMR))    Cognition:  Overall Cognitive Status: Within Functional Limits (A&O x4)    General Assessments:  Activity Tolerance  Endurance: Tolerated ~15 minutes exercise/activity with multiple rests    Sensation  Not tested; pt denied paresthesias    Strength  Strength Comments: BLE: grossly >/=3+/5 except R quad >/=3-/5    Motor Control  Not formally assessed. Mild R knee buckling in stance/weigh bearing.  Movements are Fluid and Coordinated: Yes    Postural Control  Within Functional Limits    Static Sitting: Feet supported (mod I)    Static Standing: (BUE supported: close S/CGA)    Dynamic Standing: (BUE supported: close S/CGA)    Functional Assessments:  Bed Mobility  Bed Mobility 1: Supine to sitting  Level of Assistance 1: Modified independent  Bed Mobility Comments 1: HOB elevated </=40°; toward L side    Bed Mobility  2: Scooting (fwd while seated EOB)  Level of Assistance 2: Modified independent (used BUE)    Bed Mobility 3: Sitting to supine  Level of Assistance 3: Independent (bed flat/no rail; toward L side)    Transfers  Technique 1: Sit to stand  Transfer Device 1: Walker (FWW; EOB/LUE on bed rail --or-- bilateral grab bars at toilet)  Transfer Level of Assistance 1:  (CGA from EOB & close S from toilet. Minimal VCs for BUE placement/walker safety.)  Trials/Comments 1: x1 at each surface    Technique 2: Stand to sit  Transfer Device 2: Walker (FWW; bilateral armrests or grab bars)  Transfer Level of Assistance 2: Close supervision    Ambulation/Gait Training  Surface 1: Level tile  Device 1: Rolling walker  Assistance 1:  (CGA/close S. Verbally cued as reinforcement of her slow movements and good walker management, given R knee buckling.)  Comments/Distance (ft) 1: </=15 ft x2  with step through pattern, slow velocity & discontinuous movement. Good walker management noted. Mild, intermittent R knee buckling observed without threat for LOB.     Treatments:  Therapeutic Exercise  Supine:   -bilat AP, QS x10 each   -R knee flex, SAQ x20 each    Seated, RLE x10 each:   -knee ext   -knee flex    Therapeutic Activity  -Educated pt in PT role, POC, frequency, disposition, fall precautions (use of call light for nursing staff assist/supervision with mobility), and total knee precautions. Issued HEP hand out of all above listed therex plus SLR. Goal pf 20 reps each twice a day listed on hand out.  -Total out of bed time (</= 10 minutes) to facilitate core strengthening & pulmonary hygiene via positioning. Pt performed indep front hygiene seatd on toilet during this time.  -See above for 2nd trial of sit<>stand & ambuation, as well as cues during all functional mobility.      Outcome Measures:  [facility] Basic Mobility  Turning from your back to your side while in a flat bed without using bedrails: A little  Moving from lying on your back to sitting on the side of a flat bed without using bedrails: A little  Moving to and from bed to chair (including a wheelchair): A little  Standing up from a chair using your arms (e.g. wheelchair or bedside chair): A little  To walk in hospital room: A little  Climbing 3-5 steps with railing: A little  Basic Mobility - Total Score: 18    Encounter Problems       Encounter Problems (Active)       PT Problem       Pt will transition supine<>sit at a flat bed with mod I.        Start:  11/12/24    Expected End:  11/30/24            Pt will transfer sit<>stand with FWW & mod I.        Start:  11/12/24    Expected End:  11/30/24            Pt will ambulate >/=100 ft with FWW & mod I.        Start:  11/12/24    Expected End:  11/30/24            Pt will negotiate >/= 1 stair with straight cane & close S.       Start:  11/12/24    Expected End:  11/30/24            Pt state  3/3 total knee precautions independently.       Start:  11/12/24    Expected End:  11/30/24                   Education Documentation  Handouts, taught by Carleen Hernandez PT at 11/12/2024 10:22 AM.  Learner: Patient  Readiness: Acceptance  Method: Explanation  Response: Verbalizes Understanding, Needs Reinforcement  Comment: Sequening for stair negotiation, including with cane, was written on HEP hand out issued yesterday.    Precautions, taught by Carleen Hernandez PT at 11/12/2024 10:22 AM.  Learner: Patient  Readiness: Acceptance  Method: Explanation  Response: Verbalizes Understanding, Needs Reinforcement  Comment: Sequening for stair negotiation, including with cane, was written on HEP hand out issued yesterday.    Body Mechanics, taught by Carleen Hernandez PT at 11/12/2024 10:22 AM.  Learner: Patient  Readiness: Acceptance  Method: Explanation  Response: Verbalizes Understanding, Needs Reinforcement  Comment: Sequening for stair negotiation, including with cane, was written on HEP hand out issued yesterday.    Home Exercise Program, taught by Carleen Hernandez PT at 11/12/2024 10:22 AM.  Learner: Patient  Readiness: Acceptance  Method: Explanation  Response: Verbalizes Understanding, Needs Reinforcement  Comment: Sequening for stair negotiation, including with cane, was written on HEP hand out issued yesterday.    Mobility Training, taught by Carleen Hernandez PT at 11/12/2024 10:22 AM.  Learner: Patient  Readiness: Acceptance  Method: Explanation  Response: Verbalizes Understanding, Needs Reinforcement  Comment: Sequening for stair negotiation, including with cane, was written on HEP hand out issued yesterday.    Education Comments  No comments found.

## 2024-11-12 NOTE — PROGRESS NOTES
"Stacey Dyer is a 59 y.o. female on day 0 of admission presenting with Arthritis of right knee.      Subjective   Patient seen and examined. Awake/alert/oriented. Resting in bed. Does have knee pain and reports difficulty standing because her knee is \"giving out.\" Therapy in at bedside. Patient has not had a BM, encouraged patient to take stool softener or laxative for constipation if needed. Denies chest pain, shortness of breath, nausea, or vomiting.       Objective     Last Recorded Vitals  /79 (BP Location: Left arm, Patient Position: Sitting)   Pulse 69   Temp 37 °C (98.6 °F) (Oral)   Resp 18   Wt 87.5 kg (192 lb 14.4 oz)   SpO2 96%   Intake/Output last 3 Shifts:    Intake/Output Summary (Last 24 hours) at 11/12/2024 0856  Last data filed at 11/12/2024 0600  Gross per 24 hour   Intake 2850 ml   Output 1650 ml   Net 1200 ml       Admission Weight  Weight: 87.5 kg (192 lb 14.4 oz) (11/11/24 0842)    Daily Weight  11/11/24 : 87.5 kg (192 lb 14.4 oz)    Image Results  XR knee right 1-2 views  Narrative: Interpreted By:  Jaime Graves,   STUDY:  XR KNEE RIGHT 1-2 VIEWS;  11/11/2024 1:13 pm      INDICATION:  Signs/Symptoms:Post op knee.      COMPARISON:  None.      ACCESSION NUMBER(S):  [id number]      ORDERING CLINICIAN:  CARLENE NATHAN      FINDINGS:  Bony structures:  Intact      Joint spaces:  Status post total knee arthroplasty.      Soft tissues:  Postoperative emphysema.      Other:  None significant      Impression: Satisfactory postoperative examination.      MACRO:  None      Signed by: Jaime Graves 11/11/2024 1:58 PM  Dictation workstation:   [id number]      Physical Exam  Vitals reviewed.   Constitutional:       Appearance: Normal appearance.   HENT:      Head: Normocephalic and atraumatic.   Eyes:      Extraocular Movements: Extraocular movements intact.      Conjunctiva/sclera: Conjunctivae normal.   Cardiovascular:      Rate and Rhythm: Normal rate and regular rhythm.   Pulmonary:      " Effort: Pulmonary effort is normal.      Breath sounds: Normal breath sounds. No wheezing, rhonchi or rales.   Abdominal:      General: Bowel sounds are normal.      Palpations: Abdomen is soft.      Tenderness: There is no abdominal tenderness.   Musculoskeletal:      Comments: Right surgical knee   Skin:     General: Skin is warm and dry.      Comments: Surgical dressing intact   Neurological:      General: No focal deficit present.      Mental Status: She is alert and oriented to person, place, and time.         Relevant Results  Lab Results   Component Value Date    GLUCOSE 143 (H) 11/12/2024    CALCIUM 8.6 11/12/2024     (L) 11/12/2024    K 4.0 11/12/2024    CO2 22 11/12/2024     11/12/2024    BUN 15 11/12/2024    CREATININE 0.81 11/12/2024      Lab Results   Component Value Date    WBC 12.3 (H) 11/12/2024    HGB 12.6 11/12/2024    HCT 37.6 11/12/2024     11/12/2024     11/12/2024    XR knee right 1-2 views  Result Date: 11/11/2024  Satisfactory postoperative examination.   MACRO: None   Signed by: Jaime Graves 11/11/2024 1:58 PM Dictation workstation:   [id number]         Assessment/Plan      Assessment & Plan  Arthritis of right knee    Class 1 obesity in adult    Asthma  Monitor pulse ox  PRN duonebs  Continue Advair  Stable    Right knee osteoarthritis  S/p right total knee arthroplasty by Dr. Mack on 11/11/24  Pain management  Bowel regimen-may need to hold due to chronic absorption issues following multiple ABD surgeries  Encourage incentive spirometer  PT/OT/out of bed  DVT prophylaxis     History of carcinoid tumor  S/p appendectomy, intestinal resection, lani  Patient takes Questran twice daily for absorption-resume     Hypercholesteremia  Resume home medications    Leukocytosis  Mild, likely reactive to surgery  Will check UA however low suspicion for UTI     Plan  Resume home medications  Pain management/bowel regimen-patient refused Miralax, agreeable to Colace PRN, has  chronic malabsorption  Encourage IS  DVT prophylaxis  PT/OT  Orthopedic surgery primary    No absolute contraindication to discharge from medical standpoint.  Patient should follow-up with her primary care physician outpatient.                     Cher Willis, FARIBA-CNP

## 2024-11-12 NOTE — CARE PLAN
The patient's goals for the shift include  pain control    The clinical goals for the shift include Pain free

## 2024-11-12 NOTE — PROGRESS NOTES
"Physical Therapy    Physical Therapy Treatment    Patient Name: Stacey Dyer  MRN: [id number]  Department: [address]  Room: [facility]/453-A  Today's Date: 11/12/2024  Time Calculation  Start Time: 1429  Stop Time: 1455  Time Calculation (min): 26 min         Assessment/Plan   PT Assessment Results: Decreased strength, Decreased mobility, Decreased range of motion, Pain (pt endorsed \"weird\" sensation at R knee \"like I don't have full control\" of it to fully extend the joint)  Rehab Prognosis: Good  Strengths: Support and attitude of living partners, Rehab experience, Premorbid level of function, Living arrangement secure, Housing layout, Attitude of self, Access to adaptive/assistive products  Barriers to Participation: Comorbidities  End of Session Communication: Bedside nurse  Assessment Comment: The pt made adequate progress toward her functional mobility goals. Pt required minimally increased assist at S level during functional mobility compared to her reported baseline. Pt would benefit from continued skilled PT services for maximizing independence and safety prior to & after discharge (LOW intensity).  End of Session Patient Position: Bed, 3 rail up, Alarm off, not on at start of session (call light & needs within reach)     PT Plan  Treatment/Interventions: Bed mobility, Transfer training, Gait training, Strengthening, Therapeutic exercise, Therapeutic activity, Stair training, Range of motion, Home exercise program  PT Plan: Ongoing PT  PT Frequency: BID  PT Discharge Recommendations: Low intensity level of continued care  PT Recommended Transfer Status: Assist x1, Stand by assist, Assistive device  PT - OK to Discharge: Yes      General Visit Information:   PT  Visit  PT Received On: 11/12/24  General  Reason for Referral: Impaired functional mobility. This 59 year old was admitted for planned surgery. She was now s/p R TKA by Dr. Jimenez on 11/11/24.  Past Medical History Relevant to Rehab: asthma, carcinoid tumor " "s/p appy & partial colectomy, hepatitis c/b liver steatosis, s/p lani, s/p L TKA (April 2024), TMJ syndrome  Family/Caregiver Present: No  Prior to Session Communication: Bedside nurse  Patient Position Received: Bed, 3 rail up, Alarm off, not on at start of session  General Comment: Pt agreed to session and was fully engaged throughout. She stated R knee buckling was relatively less intense compared to this AM's session.    Subjective   Precautions:  Hearing/Visual Limitations:  (reading glasses; hearing WFL)  LE Weight Bearing Status: Weight Bearing as Tolerated (RLE WBAT)  Medical Precautions: Fall precautions  Post-Surgical Precautions: Right total knee precautions        Objective   Pain:  Pain Assessment: FLACC (Face, Legs, Activity, Cry, Consolability)  0-10 (Numeric) Pain Score: 4 (Pt stated \"I'm okay\" when asked about her pain at rest in bed.)  Pain Type: Surgical pain  Pain Location: Knee  Pain Orientation: Right  Pain Interventions: Cold applied (per EMR, pain med administered at 13:33.)    Cognition:  Overall Cognitive Status: Within Functional Limits (to participate; not formally assessed)    Postural Control:  Static Sitting Balance  Feet unsupported (mod I)    Static Standing Balance  (BUE supported: close S)    Dynamic Standing Balance  (BUE supported: close S)     Activity Tolerance:  Endurance: Tolerated >/=20 min activity with multiple rests    Treatments  Therapeutic Activity  Pt able to state 1/3 TKA precautions with 1 verbal cue prior to education. Pt demo'd adherence to precautions throughout session without cues.    Bed Mobility  Bed Mobility 1: Supine to sitting  Level of Assistance 1: Modified independent  Bed Mobility Comments 1: HOB elevated via long sitting toward L side.    Bed Mobility  2: Sitting to supine  Level of Assistance 2: Modified independent  Bed Mobility Comments 2: Bed flat/no rail; toward L side; minimally increased time to perform      Transfers  Technique 1: Sit to " stand, Stand to sit  Transfer Device 1: Walker (FWW; EOB or bilateral armrests of transport chair)  Transfer Level of Assistance 1: Close supervision (minimal VCs for BUE positioning at EOB only)  Trials/Comments 1: x4 trials      Ambulation/Gait Training  Ambulation/Gait Training Performed: Yes  Ambulation/Gait Training 1  Surface 1: Level tile  Device 1: Rolling walker  Assistance 1: Close supervision (minimal VCs for R heel/toe pattern and increased cervical ext/fwd gaze.)  Comments/Distance (ft) 1: ~75 ft x2 using step through pattern with nearly continuous movement. Slowed velocity. Excellent walker management skills. Pt subjectively reported multiple subtle R knee eric throughout trials. No overt buckling noted. No threat for LOB.    Stairs  Rails 1: Bilateral  Assistance 1: Close supervision  Comment/Number of Steps 1: Up fwd/down retro x1 stair. Minimal VCs for sequencing to protect RLE. Steady throughout.     Verbal instruction & partial demo (without an actual stair) for threshold step negotiation with FWW. Pt also instructed (with partial demo) in care giver positioning for hand held assist while she used a cane to ascend stairs. Focused on caregiver's body & hand position with respect to pt and hand position ('thumb-to-thumb')    Outcome Measures:  [facility] Basic Mobility  Turning from your back to your side while in a flat bed without using bedrails: A little  Moving from lying on your back to sitting on the side of a flat bed without using bedrails: A little  Moving to and from bed to chair (including a wheelchair): A little  Standing up from a chair using your arms (e.g. wheelchair or bedside chair): A little  To walk in hospital room: A little  Climbing 3-5 steps with railing: A little  Basic Mobility - Total Score: 18    Education Documentation  Precautions, taught by Carleen Hernandez PT at 11/12/2024  4:20 PM.  Learner: Patient  Readiness: Acceptance  Method: Explanation  Response: Verbalizes  Understanding, Demonstrated Understanding, Needs Reinforcement    Mobility Training, taught by Carleen Hernandez, PT at 11/12/2024  4:20 PM.  Learner: Patient  Readiness: Acceptance  Method: Explanation  Response: Verbalizes Understanding, Demonstrated Understanding, Needs Reinforcement    Education Comments  No comments found.           Encounter Problems       Encounter Problems (Active)       PT Problem       Pt will transition supine<>sit at a flat bed with mod I.  (Progressing)       Start:  11/12/24    Expected End:  11/30/24            Pt will transfer sit<>stand with FWW & mod I.  (Progressing)       Start:  11/12/24    Expected End:  11/30/24            Pt will ambulate >/=100 ft with FWW & mod I.  (Progressing)       Start:  11/12/24    Expected End:  11/30/24            Pt will negotiate >/= 1 stair with straight cane & close S. (Progressing)       Start:  11/12/24    Expected End:  11/30/24            Pt state 3/3 total knee precautions independently. (Progressing)       Start:  11/12/24    Expected End:  11/30/24

## 2024-11-12 NOTE — PROGRESS NOTES
Pt will dc home with [facility]. [facility] will make homecare aware of when pt is ready for dc.      11/12/24 7540   Discharge Planning   Living Arrangements Spouse/significant other   Support Systems Spouse/significant other   Type of Residence Private residence   Number of Stairs to Enter Residence 1   Number of Stairs Within Residence 0   Do you have animals or pets at home? Yes   Who is requesting discharge planning? Provider   Home or Post Acute Services In home services   Type of Home Care Services Home PT   Expected Discharge Disposition Home H  ([facility])   Financial Resource Strain   How hard is it for you to pay for the very basics like food, housing, medical care, and heating? Not very   Housing Stability   In the last 12 months, was there a time when you were not able to pay the mortgage or rent on time? N   At any time in the past 12 months, were you homeless or living in a shelter (including now)? N   Transportation Needs   In the past 12 months, has lack of transportation kept you from medical appointments or from getting medications? no   In the past 12 months, has lack of transportation kept you from meetings, work, or from getting things needed for daily living? No

## 2024-11-12 NOTE — PROGRESS NOTES
"Stacey Dyer is a 59 y.o. female on day 0 of admission presenting with Arthritis of right knee.    Subjective   Doing well.  Complaining of buckling of the knee during therapy.  Worse yesterday for somewhat better today.  Does not feel comfortable going home with the knee feeling like this.  Pain otherwise controlled.  Denies chest pain or shortness of breath.  Tolerating p.o. intake.       Objective     Physical Exam  Alert and oriented x 3  Right lower extremity: Dressing dry.  Drain has been removed.  Able to straight leg raise.  Compartments of the thigh and calf are soft.  Active ankle dorsiflexion plantarflexion intact.  Otherwise neurovascularly intact.    Last Recorded Vitals  Blood pressure 102/53, pulse 51, temperature 36.3 °C (97.3 °F), temperature source Temporal, resp. rate 16, height 1.6 m (5' 2.99\"), weight 87.5 kg (192 lb 14.4 oz), SpO2 94%.      Relevant Results  I reviewed postoperative imaging of the right total knee: Stable positioning of knee replacement.    Results for orders placed or performed during the hospital encounter of 11/11/24 (from the past 24 hours)   CBC   Result Value Ref Range    WBC 12.3 (H) 4.4 - 11.3 x10*3/uL    nRBC 0.0 0.0 - 0.0 /100 WBCs    RBC 3.75 (L) 4.00 - 5.20 x10*6/uL    Hemoglobin 12.6 12.0 - 16.0 g/dL    Hematocrit 37.6 36.0 - 46.0 %     80 - 100 fL    MCH 33.6 26.0 - 34.0 pg    MCHC 33.5 32.0 - 36.0 g/dL    RDW 12.8 11.5 - 14.5 %    Platelets 263 150 - 450 x10*3/uL   Basic metabolic panel   Result Value Ref Range    Glucose 143 (H) 74 - 99 mg/dL    Sodium 135 (L) 136 - 145 mmol/L    Potassium 4.0 3.5 - 5.3 mmol/L    Chloride 106 98 - 107 mmol/L    Bicarbonate 22 21 - 32 mmol/L    Anion Gap 11 10 - 20 mmol/L    Urea Nitrogen 15 6 - 23 mg/dL    Creatinine 0.81 0.50 - 1.05 mg/dL    eGFR 83 >60 mL/min/1.73m*2    Calcium 8.6 8.6 - 10.3 mg/dL       Assessment/Plan   Principal Problem:    Arthritis of right knee  Active Problems:    Class 1 obesity in " adult      Problem List:  Right total knee replacement: Postoperative day 1.  Mobilize physical therapy weightbearing as tolerated.  Plan for home with home health care tomorrow.  1 more day of therapy.  DVT risk: Aspirin twice daily        Rudy Mack MD

## 2024-11-12 NOTE — PROGRESS NOTES
Occupational Therapy    Evaluation/Treatment    Patient Name: Stacey Dyer  MRN: [id number]  Department: [facility]  Room: [facility]453-A  Today's Date: 11/12/24  Time Calculation  Start Time: 0801  Stop Time: 0843  Time Calculation (min): 42 min       Assessment:  OT Assessment: Pt presents on eval with generalized weakness after surgery, intermittent mild R knee buckling, decreased activity tolerance, and impaired standing balance affecting her self-care and functional transfers/mobility. Pt will benefit from continued skilled OT to address these deficits.  Prognosis: Good  Evaluation/Treatment Tolerance: Patient tolerated treatment well  End of Session Communication: Bedside nurse  End of Session Patient Position: Up in chair, Alarm on (Needs in reach and nurse aware.)  OT Assessment Results: Decreased ADL status, Decreased endurance, Decreased functional mobility, Decreased IADLs    Plan:  Treatment Interventions: ADL retraining, Functional transfer training, Endurance training, Patient/family training, Equipment evaluation/education, Neuromuscular reeducation, Compensatory technique education  OT Frequency: 5 times per week  OT Discharge Recommendations: Low intensity level of continued care  Equipment Recommended upon Discharge: Wheeled walker  OT Recommended Transfer Status: Minimal assist  OT - OK to Discharge: Yes      Subjective     General:   OT Received On: 11/12/24  General  Reason for Referral: s/p R TKR, impaired ADL's/mobility  Referred By: Rudy Mack MD  Past Medical History Relevant to Rehab: asthma, carcinoid tumor, fatty liver, partial colectomy, appy, lani, L TKR  Family/Caregiver Present: No  Prior to Session Communication: Bedside nurse  Patient Position Received: Bed, 3 rail up, Alarm off, not on at start of session  General Comment: Pt is a 60 yo female admitted to the hospital for an elective R TKR on 11/11/2024 by Dr. Mack.    Precautions:  Hearing/Visual Limitations: wears glasses  LE  Weight Bearing Status: Weight Bearing as Tolerated (RLE)  Medical Precautions: Fall precautions  Post-Surgical Precautions: Right total knee precautions    Pain:  Pain Assessment  Pain Assessment: 0-10  0-10 (Numeric) Pain Score: 7  Pain Type: Surgical pain  Pain Location: Knee  Pain Orientation: Right  Pain Interventions: Ambulation/increased activity, Other (Comment) (Nurse aware.)    Objective     Cognition:  Overall Cognitive Status: Within Functional Limits  Orientation Level: Oriented X4  Insight: Within function limits    Home Living:  Type of Home: House  Lives With: Spouse  Home Adaptive Equipment: Walker rolling or standard, Cane  Home Layout: Two level  Home Access: Level entry (threshold only)  Bathroom Shower/Tub: Walk-in shower  Bathroom Toilet: Standard  Bathroom Equipment: Shower chair with back    Prior Function:  Level of [place]: Independent with ADLs and functional transfers, Independent with homemaking with ambulation  Receives Help From: Family (spouse)  ADL Assistance: Independent  Homemaking Assistance: Independent  Ambulatory Assistance: Independent  Hand Dominance: Right  Prior Function Comments: Pt was active and driving PTA.    ADL:  Eating Assistance: Independent  Grooming Assistance: Minimal  Grooming Deficit: Steadying, Standing with assistive device  Bathing Assistance: Minimal  UE Dressing Assistance: Stand by  UE Dressing Deficit: Setup  LE Dressing Assistance: Minimal  Toileting Assistance with Device: Minimal    Activity Tolerance:  Activity Tolerance Comments: Fair    Bed Mobility/Transfers: Bed Mobility  Bed Mobility:  (Pt completed supine to sit in bed with close S for safety.)    Transfers  Transfer:  (Pt completed sit<>stand with min A for balance/safety and pt reporting mild R knee buckling when initially standing up.)    Functional Mobility:  Functional Mobility  Functional Mobility Performed:  (Pt completed functional mobility to/from the bathroom using a RW with min  A for balance/safety and due to intermittent mild buckling from her R knee.)    Standing Balance:  Static Standing Balance  Static Standing-Balance Support: Bilateral upper extremity supported  Static Standing-Level of Assistance: Minimum assistance    Therapy/Activity: Therapeutic Activity  Therapeutic Activity Performed:  (See bed mobility, transfers, functional mobility, and static standing balance activity. Also, provided thorough education on knee precautions, car transfers, and other home safety items.)    Sensation:  Sensation Comment: JANETLOPEZ's WFL    Strength:  Strength Comments: NEAL's 4/5    Coordination:  Coordination Comment: JANETLOPEZ's WFL grossly     Hand Function:  Hand Function  Gross Grasp: Functional  Coordination: Functional      Outcome Measures: [facility] Daily Activity  Putting on and taking off regular lower body clothing: A little  Bathing (including washing, rinsing, drying): A little  Putting on and taking off regular upper body clothing: A little  Toileting, which includes using toilet, bedpan or urinal: A little  Taking care of personal grooming such as brushing teeth: A little  Eating Meals: None  Daily Activity - Total Score: 19      Education Documentation  Precautions, taught by Shayan Shafer Jr., OT at 11/12/2024  9:53 AM.  Learner: Patient  Readiness: Acceptance  Method: Explanation, Demonstration  Response: Verbalizes Understanding    ADL Training, taught by Shayan Shafer Jr. OT at 11/12/2024  9:53 AM.  Learner: Patient  Readiness: Acceptance  Method: Explanation, Demonstration  Response: Verbalizes Understanding    Education Comments  No comments found.      Goals:  Encounter Problems       Encounter Problems (Active)       OT Goals       Pt will complete all functional transfers and mobility with mod indep using a RW. (Progressing)       Start:  11/11/24    Expected End:  12/12/24            Pt will tolerate functional mobility for a household distance using a RW with mod indep.  (Progressing)       Start:  11/11/24    Expected End:  12/12/24            Pt will complete all ADL's with mod indep/indep using adaptive equipment as needed. (Progressing)       Start:  11/11/24    Expected End:  12/12/24

## 2024-11-12 NOTE — PROGRESS NOTES
Spiritual Care Visit    Clinical Encounter Type  Visited With: Patient and family together  Routine Visit: Introduction  Continue Visiting: Yes         Values/Beliefs  Spiritual Requests During Hospitalization: Stacey received the sacramens of the [employer]oint[employer] and Commun[employer] today with his wife Rina. They are from my parish at [facility].     Reyes Thomas

## 2024-11-12 NOTE — NURSING NOTE
Obtained bedside shift report from nightshift RN with patient sitting up in bed on arrival. Patient has not worked with PT yet as she was a later admission. Patient has gotten up to use the bathroom overnight and RN reported her leg did buckle a few times when ambulating. Patient was medicated for pain recently and stated she is comfortable at this time. Patient stated she is concerned that she feels so different with this knee replacement then she did with her last one. Tried to encourage patient and told her we would wait and see how she does with therapy. Patient agreeable and call light is in reach.

## 2024-11-12 NOTE — NURSING NOTE
"Pt up ambulating to BR. Stated that her Rt knee feels \"wobbly\". Slight buckling noted at times w/ambulation on two occasion. Pt noted stronger as she ambulated further. Pt stated being concerned with inability to freely move her leg. Pt back to bed and sitting at side of bed- This RN assisted pt w/passive ROM to Rt leg. Neurovascular checks WNL. Pt is able to flex/dorsiflex Rt foot freely. Assisted Pt back into bed. Pt was medicated w/toradol. Discussed w/Pt that toradol will help to alleviate w/stiffness and swelling. Ice packs apllied to surgical knee. SCDs in place. Will continue to monitor.   "

## 2024-11-12 NOTE — NURSING NOTE
Patient has been working well with therapy all day today and is currently sitting up in the chair. Patient stated she has felt more steady on her feet the past couple times she has gotten up then she was before. Patient denied any other needs at this time and call light is in reach.

## 2024-11-13 ENCOUNTER — DOCUMENTATION (OUTPATIENT)
Dept: HOME HEALTH SERVICES | Facility: HOME HEALTH | Age: 59
End: 2024-11-13
Payer: COMMERCIAL

## 2024-11-13 ENCOUNTER — HOME HEALTH ADMISSION (OUTPATIENT)
Dept: HOME HEALTH SERVICES | Facility: HOME HEALTH | Age: 59
End: 2024-11-13
Payer: COMMERCIAL

## 2024-11-13 ENCOUNTER — PHARMACY VISIT (OUTPATIENT)
Dept: PHARMACY | Facility: CLINIC | Age: 59
End: 2024-11-13
Payer: MEDICARE

## 2024-11-13 VITALS
OXYGEN SATURATION: 95 % | HEART RATE: 57 BPM | DIASTOLIC BLOOD PRESSURE: 89 MMHG | WEIGHT: 192.9 LBS | TEMPERATURE: 97.2 F | SYSTOLIC BLOOD PRESSURE: 138 MMHG | BODY MASS INDEX: 34.18 KG/M2 | RESPIRATION RATE: 16 BRPM | HEIGHT: 63 IN

## 2024-11-13 PROCEDURE — 9420000001 HC RT PATIENT EDUCATION 5 MIN

## 2024-11-13 PROCEDURE — 2500000001 HC RX 250 WO HCPCS SELF ADMINISTERED DRUGS (ALT 637 FOR MEDICARE OP): Performed by: NURSE PRACTITIONER

## 2024-11-13 PROCEDURE — 99231 SBSQ HOSP IP/OBS SF/LOW 25: CPT | Performed by: NURSE PRACTITIONER

## 2024-11-13 PROCEDURE — RXMED WILLOW AMBULATORY MEDICATION CHARGE

## 2024-11-13 PROCEDURE — 2500000001 HC RX 250 WO HCPCS SELF ADMINISTERED DRUGS (ALT 637 FOR MEDICARE OP): Performed by: ORTHOPAEDIC SURGERY

## 2024-11-13 PROCEDURE — 97530 THERAPEUTIC ACTIVITIES: CPT | Mod: GP | Performed by: PHYSICAL THERAPIST

## 2024-11-13 PROCEDURE — 94640 AIRWAY INHALATION TREATMENT: CPT

## 2024-11-13 PROCEDURE — 7100000011 HC EXTENDED STAY RECOVERY HOURLY - NURSING UNIT

## 2024-11-13 PROCEDURE — 97530 THERAPEUTIC ACTIVITIES: CPT | Mod: GO

## 2024-11-13 ASSESSMENT — PAIN - FUNCTIONAL ASSESSMENT
PAIN_FUNCTIONAL_ASSESSMENT: 0-10
PAIN_FUNCTIONAL_ASSESSMENT: FLACC (FACE, LEGS, ACTIVITY, CRY, CONSOLABILITY)

## 2024-11-13 ASSESSMENT — PAIN SCALES - GENERAL
PAINLEVEL_OUTOF10: 7
PAINLEVEL_OUTOF10: 8
PAINLEVEL_OUTOF10: 5 - MODERATE PAIN
PAINLEVEL_OUTOF10: 7

## 2024-11-13 ASSESSMENT — COGNITIVE AND FUNCTIONAL STATUS - GENERAL
MOVING TO AND FROM BED TO CHAIR: A LITTLE
TOILETING: A LITTLE
DAILY ACTIVITIY SCORE: 22
PERSONAL GROOMING: A LITTLE
CLIMB 3 TO 5 STEPS WITH RAILING: A LITTLE
CLIMB 3 TO 5 STEPS WITH RAILING: A LITTLE
DAILY ACTIVITIY SCORE: 20
MOBILITY SCORE: 20
MOBILITY SCORE: 22
DRESSING REGULAR LOWER BODY CLOTHING: A LITTLE
WALKING IN HOSPITAL ROOM: A LITTLE
TURNING FROM BACK TO SIDE WHILE IN FLAT BAD: A LITTLE
STANDING UP FROM CHAIR USING ARMS: A LITTLE
DRESSING REGULAR LOWER BODY CLOTHING: A LITTLE
HELP NEEDED FOR BATHING: A LITTLE
TOILETING: A LITTLE

## 2024-11-13 ASSESSMENT — PAIN DESCRIPTION - DESCRIPTORS
DESCRIPTORS: DISCOMFORT
DESCRIPTORS: DISCOMFORT
DESCRIPTORS: DISCOMFORT;ACHING
DESCRIPTORS: DISCOMFORT
DESCRIPTORS: DISCOMFORT

## 2024-11-13 ASSESSMENT — PAIN DESCRIPTION - ORIENTATION: ORIENTATION: RIGHT

## 2024-11-13 ASSESSMENT — PAIN DESCRIPTION - LOCATION: LOCATION: KNEE

## 2024-11-13 NOTE — PROGRESS NOTES
"Stacey Dyer is a 59 y.o. female on day 0 of admission presenting with Arthritis of right knee.    Subjective   Waiting to be discharged.  Feeling comfortable.  No further buckling noted.  Denies chest pain or shortness of breath.  Pain controlled.       Objective     Physical Exam  Alert and oriented x 3  Right lower extremity: Dressing dry.  Able to straight leg raise.  Compartments are soft throughout.  Neurovascularly intact.      Last Recorded Vitals  Blood pressure 138/89, pulse 57, temperature 36.2 °C (97.2 °F), temperature source Temporal, resp. rate 16, height 1.6 m (5' 2.99\"), weight 87.5 kg (192 lb 14.4 oz), SpO2 95%.      Relevant Results      No results found for this or any previous visit (from the past 24 hours).    Assessment/Plan   Principal Problem:    Arthritis of right knee  Active Problems:    Class 1 obesity in adult      Problem List:  Right total knee replacement: Postoperative day 2.  Mobilize physical therapy weightbearing as tolerated plan for home with home health care  DVT risk: Aspirin twice daily    Stable for discharge today    Rudy Mack MD     "

## 2024-11-13 NOTE — PROGRESS NOTES
Occupational Therapy    OT Treatment    Patient Name: Stacey Dyer  MRN: [id number]  Department: [address]  Room: [facility]453-A  Today's Date: 11/13/2024  Time Calculation  Start Time: 0902  Stop Time: 0917  Time Calculation (min): 15 min        Assessment:  OT Assessment: Pt expressed no concerns regarding any ADL's and declined the need to address any during this session. Pt is approaching an independent level for functional transfers/mobility.  Prognosis: Good  Evaluation/Treatment Tolerance: Patient tolerated treatment well  End of Session Communication: Bedside nurse  End of Session Patient Position: Up in chair, Alarm on (Needs in reach.)    Plan:  Treatment Interventions: ADL retraining, Functional transfer training, Endurance training, Patient/family training, Equipment evaluation/education, Neuromuscular reeducation, Compensatory technique education  OT Frequency: 5 times per week  OT Discharge Recommendations: Low intensity level of continued care  Equipment Recommended upon Discharge: Wheeled walker  OT Recommended Transfer Status: Stand by assist  OT - OK to Discharge: Yes      Subjective     Previous Visit Info:  OT Last Visit  OT Received On: 11/13/24    General:  General  Family/Caregiver Present: No  Prior to Session Communication: Bedside nurse  Patient Position Received: Bed, 3 rail up, Alarm off, not on at start of session  General Comment: Cleared by nurse prior to session and pt agreeable to OT treatment.    Precautions:  LE Weight Bearing Status: Weight Bearing as Tolerated (RLE)  Medical Precautions: Fall precautions  Post-Surgical Precautions: Right total knee precautions    Pain:  Pain Assessment  Pain Assessment: 0-10  0-10 (Numeric) Pain Score: 7  Pain Type: Surgical pain  Pain Location: Knee  Pain Orientation: Right  Pain Interventions: Ambulation/increased activity, Other (Comment) (Nurse aware.)    Objective      Bed Mobility/Transfers: Bed Mobility  Bed Mobility: Yes  Bed Mobility 1  Bed  Mobility 1: Supine to sitting  Level of Assistance 1: Independent    Transfers  Transfer: Yes  Transfer 1  Transfer From 1: Bed to  Transfer to 1: Stand  Technique 1: Sit to stand  Transfer Device 1: Walker  Transfer Level of Assistance 1: Close supervision  Trials/Comments 1: Supervision for safety.  Transfers 2  Transfer From 2: Stand to  Transfer to 2: Chair with arms  Technique 2: Stand to sit  Transfer Device 2: Walker  Transfer Level of Assistance 2: Close supervision  Trials/Comments 2: Supervision for safety.    Functional Mobility:  Functional Mobility  Functional Mobility Performed: Yes  Functional Mobility 1  Device 1: Rolling walker  Assistance 1: Close supervision  Comments 1: Pt tolerated functional mobility for a household distance using a RW with close S for safety and no signs of R knee buckling, which pt reports is 100% gone since yesterday evening.      Outcome Measures:[facility] Daily Activity  Putting on and taking off regular lower body clothing: A little  Bathing (including washing, rinsing, drying): A little  Putting on and taking off regular upper body clothing: None  Toileting, which includes using toilet, bedpan or urinal: A little  Taking care of personal grooming such as brushing teeth: A little  Eating Meals: None  Daily Activity - Total Score: 20      Education Documentation  No documentation found.  Education Comments  No comments found.      Goals:  Encounter Problems       Encounter Problems (Active)       OT Goals       Pt will complete all functional transfers and mobility with mod indep using a RW. (Progressing)       Start:  11/11/24    Expected End:  12/12/24            Pt will tolerate functional mobility for a household distance using a RW with mod indep. (Progressing)       Start:  11/11/24    Expected End:  12/12/24            Pt will complete all ADL's with mod indep/indep using adaptive equipment as needed. (Progressing)       Start:  11/11/24    Expected End:  12/12/24

## 2024-11-13 NOTE — PROGRESS NOTES
Stacey Dyer is a 59 y.o. female on day 0 of admission presenting with Arthritis of right knee.      Subjective   Patient seen and examined. Awake/alert/oriented.  Sitting up in a chair.  Reports improvement working with therapy today.  Will having knee pain.  Denies chest pain, shortness of breath, fevers, chills, nausea, or vomiting.  States she did have a bowel movement last night and this morning.       Objective     Last Recorded Vitals  /73 (BP Location: Left arm, Patient Position: Lying)   Pulse 57   Temp 36.3 °C (97.3 °F) (Temporal)   Resp 17   Wt 87.5 kg (192 lb 14.4 oz)   SpO2 92%   Intake/Output last 3 Shifts:    Intake/Output Summary (Last 24 hours) at 11/13/2024 1013  Last data filed at 11/13/2024 0949  Gross per 24 hour   Intake 802 ml   Output 440 ml   Net 362 ml       Admission Weight  Weight: 87.5 kg (192 lb 14.4 oz) (11/11/24 0842)    Daily Weight  11/11/24 : 87.5 kg (192 lb 14.4 oz)    Image Results  XR knee right 1-2 views  Narrative: Interpreted By:  Jaime Graves,   STUDY:  XR KNEE RIGHT 1-2 VIEWS;  11/11/2024 1:13 pm      INDICATION:  Signs/Symptoms:Post op knee.      COMPARISON:  None.      ACCESSION NUMBER(S):  [id number]      ORDERING CLINICIAN:  CARLENE NATHAN      FINDINGS:  Bony structures:  Intact      Joint spaces:  Status post total knee arthroplasty.      Soft tissues:  Postoperative emphysema.      Other:  None significant      Impression: Satisfactory postoperative examination.      MACRO:  None      Signed by: Jaime Graves 11/11/2024 1:58 PM  Dictation workstation:   [id number]      Physical Exam  Vitals reviewed.   Constitutional:       Appearance: Normal appearance.   HENT:      Head: Normocephalic and atraumatic.   Eyes:      Extraocular Movements: Extraocular movements intact.      Conjunctiva/sclera: Conjunctivae normal.   Cardiovascular:      Rate and Rhythm: Normal rate and regular rhythm.   Pulmonary:      Effort: Pulmonary effort is normal.      Breath  sounds: Normal breath sounds. No wheezing, rhonchi or rales.   Abdominal:      General: Bowel sounds are normal.      Palpations: Abdomen is soft.      Tenderness: There is no abdominal tenderness.   Musculoskeletal:      Comments: Right surgical knee   Skin:     General: Skin is warm and dry.      Comments: Surgical dressing intact   Neurological:      General: No focal deficit present.      Mental Status: She is alert and oriented to person, place, and time.         Relevant Results  Lab Results   Component Value Date    GLUCOSE 143 (H) 11/12/2024    CALCIUM 8.6 11/12/2024     (L) 11/12/2024    K 4.0 11/12/2024    CO2 22 11/12/2024     11/12/2024    BUN 15 11/12/2024    CREATININE 0.81 11/12/2024      Lab Results   Component Value Date    WBC 12.3 (H) 11/12/2024    HGB 12.6 11/12/2024    HCT 37.6 11/12/2024     11/12/2024     11/12/2024    XR knee right 1-2 views  Result Date: 11/11/2024  Satisfactory postoperative examination.   MACRO: None   Signed by: Jaime Graves 11/11/2024 1:58 PM Dictation workstation:   [id number]         Assessment/Plan      Assessment & Plan  Arthritis of right knee    Class 1 obesity in adult    Asthma  Monitor pulse ox  PRN duonebs  Continue Advair  Stable    Right knee osteoarthritis  S/p right total knee arthroplasty by Dr. Mack on 11/11/24  Pain management  Bowel regimen-may need to hold due to chronic absorption issues following multiple ABD surgeries  Encourage incentive spirometer  PT/OT/out of bed  DVT prophylaxis     History of carcinoid tumor  S/p appendectomy, intestinal resection, lani  Patient takes Questran twice daily for absorption-resume     Hypercholesteremia  Resume home medications    Leukocytosis  Mild, likely reactive to surgery  Will check UA however low suspicion for UTI     Plan  Resume home medications  Pain management/bowel regimen-patient refused Miralax, agreeable to Colace PRN, has chronic malabsorption  Encourage IS  DVT  prophylaxis  PT/OT  Orthopedic surgery primary    No absolute contraindication to discharge from medical standpoint.  Patient should follow-up with her primary care physician outpatient.                     Cher Willis, APRN-CNP

## 2024-11-13 NOTE — CARE PLAN
The patient's goals for the shift include  pain control    The clinical goals for the shift include Work with PT

## 2024-11-13 NOTE — NURSING NOTE
Hand over report given to RN. Pt asleep at this time No s/s of pain/discomfort noted. Call light in reach.

## 2024-11-13 NOTE — NURSING NOTE
Patient discharged. Called  for ride home. Pharmacy delivered medications to room. Patient will call katerine  arrives to get wheelchair for discharge.

## 2024-11-13 NOTE — HH CARE COORDINATION
Home Care received a Referral for Physical Therapy. We have processed the referral for a Start of Care on 11/14/24.     If you have any questions or concerns, please feel free to contact us at [phone]. Follow the prompts, enter your five digit zip code, and you will be directed to your care team on [facility].

## 2024-11-13 NOTE — PROGRESS NOTES
Pt anticipated to dc home today, [facility] has processed referral, will reach out to pt to schedule time, soc for 11/14.      11/13/24 1300   Discharge Planning   Expected Discharge Disposition Home H  ([facility])

## 2024-11-13 NOTE — PROGRESS NOTES
Physical Therapy    Physical Therapy Treatment    Patient Name: Stacey Dyer  MRN: [id number]  Department: [address]  Room: [facility]-  Today's Date: 11/13/2024  Time Calculation  Start Time: 0944  Stop Time: 1001  Time Calculation (min): 17 min         Assessment/Plan   PT Assessment  Assessment Comment: The pt made adequate progress toward her functional mobility goals. Pt required distant S level during some functional mobility compared to her  baseline of independence. Pt would benefit from continued skilled PT services for maximizing independence and safety after discharge (LOW intensity).  End of Session Patient Position: Bed, 3 rail up (RN ok with alarm off. Call light & tray table within reach.)     PT Plan  Treatment/Interventions: Bed mobility, Transfer training, Gait training, Strengthening, Therapeutic exercise, Therapeutic activity, Stair training, Range of motion, Home exercise program  PT Plan:  (Order completed. Discontinue acute care PT.)  PT Frequency: BID  PT Discharge Recommendations: Low intensity level of continued care  PT Recommended Transfer Status: Assist x1, Stand by assist, Assistive device  PT - OK to Discharge: Yes      General Visit Information:   PT  Visit  PT Received On: 11/13/24  General  Reason for Referral: Impaired functional mobility. This 59 year old was admitted for planned surgery. She was now s/p R TKA by Dr. Jimenez on 11/11/24.  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 Pt agreed to session, participated fully & repored plan to d/c home this PM.    Subjective   Precautions:  Precautions  LE Weight Bearing Status: Weight Bearing as Tolerated (RLE)  Medical Precautions: Fall  precautions  Post-Surgical Precautions: Right total knee precautions      Objective   Pain:  Pain Assessment  Pain Assessment: FLACC (Face, Legs, Activity, Cry, Consolability)  0-10 (Numeric) Pain Score: 7  Pain Type: Surgical pain  Pain Location: Knee  Pain Orientation: Right  Pain Interventions: Ambulation/increased activity, Repositioned, Cold applied (per EMR, pain meds administered this AM at 08:11 & 09:11)    Cognition:  Overall Cognitive Status: Within Functional Limits (to participate, not formally assessed)    Postural Control:  Static Sitting Balance  Feet supported (mod I)    Static Standing Balance    (BUE supported at FWW: mod I)    Dynamic Standing Balance  (BUE supported at FWW: distant S/mod I)       Activity Tolerance:  Endurance did not limit participation in activity (R knee pain appeared to be the limiting factor to increased ambulation distance.)    Treatments:  Therapeutic Exercise  Seated, RLE x20   -knee flex   -knee ext    Pt reported she performed all therex on HEP hand out* last evening (20 reps each). She requested to return to bed after ambulation to change position (from sitting) and ice R knee. Pt stated she would perform supine therex later this AM.      *supine: bilat AP & QS; R knee flex, SAQ, SLR; seated R LAQ, knee flex     Therapeutic Activity  Pt demo'd adherence to TKA precautions (no cues required).    Bed Mobility  Bed Mobility 1: Sitting to supine  Level of Assistance 1: Independent  Bed Mobility Comments 1: bed flat, toward L side    Transfers  Technique 1: Sit to stand  Transfer Device 1: Walker (FWW; bilat armrests)  Transfer Level of Assistance 1: Modified independent  Trials/Comments 1: x4 trials    Technique 2: Stand to sit  Transfer Device 2: Walker (FWW; EOB)  Transfer Level of Assistance 2: Distant supervision (1 verbal cue for safe BUE placement)    Ambulation/Gait Training  Surface 1: Level tile  Device 1: Rolling walker  Assistance 1:  (distant S/mod  I)  Comments/Distance (ft) 1: 120 ft using step through pattern, slowed velocity & nearly continuous movement. Excellent R geri/toe pattern. No threat for LOB.    Stairs  Pt declined to practice today and reported feeling confident to negotiate home threshold step with supervision/assist from spouse.       Outcome Measures:  [facility] Basic Mobility  Turning from your back to your side while in a flat bed without using bedrails: None  Moving from lying on your back to sitting on the side of a flat bed without using bedrails: A little  Moving to and from bed to chair (including a wheelchair): None  Standing up from a chair using your arms (e.g. wheelchair or bedside chair): None  To walk in hospital room: None  Climbing 3-5 steps with railing: A little  Basic Mobility - Total Score: 22    Education Documentation  Home Exercise Program, taught by Carleen Hernandez PT at 11/13/2024 10:14 AM.  Learner: Patient  Readiness: Acceptance  Method: Explanation  Response: Verbalizes Understanding, Demonstrated Understanding    Mobility Training, taught by Carleen Hernandez PT at 11/13/2024 10:14 AM.  Learner: Patient  Readiness: Acceptance  Method: Explanation  Response: Verbalizes Understanding, Demonstrated Understanding    Education Comments  No comments found.       Encounter Problems       Encounter Problems (Active)       PT Problem       Pt will transition supine<>sit at a flat bed with mod I.  (Progressing)       Start:  11/12/24    Expected End:  11/30/24            Pt will transfer sit<>stand with FWW & mod I.  (Met)       Start:  11/12/24    Expected End:  11/30/24    Resolved:  11/13/24         Pt will ambulate >/=100 ft with FWW & mod I.  (Met)       Start:  11/12/24    Expected End:  11/30/24    Resolved:  11/13/24         Pt will negotiate >/= 1 stair with straight cane & close S. (Progressing)       Start:  11/12/24    Expected End:  11/30/24            Pt state 3/3 total knee precautions independently. (Met)       Start:   11/12/24    Expected End:  11/30/24    Resolved:  11/13/24

## 2024-11-13 NOTE — CARE PLAN
The patient's goals for the shift include  pain control    The clinical goals for the shift include discharge

## 2024-11-14 ENCOUNTER — HOME CARE VISIT (OUTPATIENT)
Dept: HOME HEALTH SERVICES | Facility: HOME HEALTH | Age: 59
End: 2024-11-14
Payer: COMMERCIAL

## 2024-11-14 VITALS — TEMPERATURE: 98.4 F | DIASTOLIC BLOOD PRESSURE: 94 MMHG | SYSTOLIC BLOOD PRESSURE: 168 MMHG | HEART RATE: 70 BPM

## 2024-11-14 PROCEDURE — G0151 HHCP-SERV OF PT,EA 15 MIN: HCPCS

## 2024-11-14 SDOH — HEALTH STABILITY: PHYSICAL HEALTH: EXERCISE TYPE: LE THER EX

## 2024-11-14 ASSESSMENT — ENCOUNTER SYMPTOMS
PAIN LOCATION: RIGHT KNEE
HIGHEST PAIN SEVERITY IN PAST 24 HOURS: 10/10
LIMITED RANGE OF MOTION: 1
HYPERTENSION: 1
MUSCLE WEAKNESS: 1
PAIN: 1
PAIN LOCATION - PAIN SEVERITY: 6/10
PERSON REPORTING PAIN: PATIENT
SHORTNESS OF BREATH: T

## 2024-11-14 ASSESSMENT — ACTIVITIES OF DAILY LIVING (ADL)
AMBULATION_DISTANCE/DURATION_TOLERATED: 150 FT
AMBULATION ASSISTANCE: STAND BY ASSIST
ENTERING_EXITING_HOME: STAND BY ASSIST
OASIS_M1830: 03
CURRENT_FUNCTION: STAND BY ASSIST
PHYSICAL TRANSFERS ASSESSED: 1
CURRENT_FUNCTION: CONTACT GUARD ASSIST
AMBULATION ASSISTANCE: 1
AMBULATION ASSISTANCE ON FLAT SURFACES: 1

## 2024-11-18 ENCOUNTER — HOME CARE VISIT (OUTPATIENT)
Dept: HOME HEALTH SERVICES | Facility: HOME HEALTH | Age: 59
End: 2024-11-18
Payer: COMMERCIAL

## 2024-11-18 VITALS — SYSTOLIC BLOOD PRESSURE: 124 MMHG | DIASTOLIC BLOOD PRESSURE: 86 MMHG | HEART RATE: 86 BPM | TEMPERATURE: 97.6 F

## 2024-11-18 PROCEDURE — G0151 HHCP-SERV OF PT,EA 15 MIN: HCPCS

## 2024-11-18 SDOH — HEALTH STABILITY: PHYSICAL HEALTH: EXERCISE TYPE: LE THER EX

## 2024-11-18 ASSESSMENT — ACTIVITIES OF DAILY LIVING (ADL)
AMBULATION ASSISTANCE ON FLAT SURFACES: 1
AMBULATION ASSISTANCE: INDEPENDENT
PHYSICAL TRANSFERS ASSESSED: 1
AMBULATION ASSISTANCE: 1
CURRENT_FUNCTION: INDEPENDENT
AMBULATION_DISTANCE/DURATION_TOLERATED: 150 FT

## 2024-11-18 ASSESSMENT — ENCOUNTER SYMPTOMS
MUSCLE WEAKNESS: 1
PAIN: 1
PAIN LOCATION: RIGHT KNEE
LIMITED RANGE OF MOTION: 1
PAIN LOCATION - PAIN SEVERITY: 4/10

## 2024-11-20 ENCOUNTER — HOME CARE VISIT (OUTPATIENT)
Dept: HOME HEALTH SERVICES | Facility: HOME HEALTH | Age: 59
End: 2024-11-20
Payer: COMMERCIAL

## 2024-11-20 VITALS — SYSTOLIC BLOOD PRESSURE: 146 MMHG | HEART RATE: 76 BPM | TEMPERATURE: 97.9 F | DIASTOLIC BLOOD PRESSURE: 96 MMHG

## 2024-11-20 PROCEDURE — G0151 HHCP-SERV OF PT,EA 15 MIN: HCPCS

## 2024-11-20 SDOH — HEALTH STABILITY: PHYSICAL HEALTH: EXERCISE TYPE: LE THER EX

## 2024-11-20 ASSESSMENT — ACTIVITIES OF DAILY LIVING (ADL)
AMBULATION ASSISTANCE: 1
AMBULATION ASSISTANCE ON FLAT SURFACES: 1
PHYSICAL TRANSFERS ASSESSED: 1
CURRENT_FUNCTION: INDEPENDENT
AMBULATION ASSISTANCE: SUPERVISION
AMBULATION_DISTANCE/DURATION_TOLERATED: 200 FT

## 2024-11-20 ASSESSMENT — ENCOUNTER SYMPTOMS
LIMITED RANGE OF MOTION: 1
PAIN: 1
PERSON REPORTING PAIN: PATIENT
PAIN LOCATION - PAIN SEVERITY: 5/10
PAIN LOCATION: RIGHT KNEE
MUSCLE WEAKNESS: 1

## 2024-11-25 ENCOUNTER — HOME CARE VISIT (OUTPATIENT)
Dept: HOME HEALTH SERVICES | Facility: HOME HEALTH | Age: 59
End: 2024-11-25
Payer: COMMERCIAL

## 2024-11-25 VITALS — HEART RATE: 78 BPM | SYSTOLIC BLOOD PRESSURE: 138 MMHG | TEMPERATURE: 97.8 F | DIASTOLIC BLOOD PRESSURE: 90 MMHG

## 2024-11-25 PROCEDURE — G0151 HHCP-SERV OF PT,EA 15 MIN: HCPCS

## 2024-11-25 SDOH — HEALTH STABILITY: PHYSICAL HEALTH: EXERCISE TYPE: LE THER EX

## 2024-11-25 ASSESSMENT — ENCOUNTER SYMPTOMS
MUSCLE WEAKNESS: 1
PERSON REPORTING PAIN: PATIENT
PAIN LOCATION - PAIN SEVERITY: 3/10
PAIN: 1
LIMITED RANGE OF MOTION: 1
PAIN LOCATION: RIGHT KNEE

## 2024-11-25 ASSESSMENT — ACTIVITIES OF DAILY LIVING (ADL)
PHYSICAL TRANSFERS ASSESSED: 1
AMBULATION ASSISTANCE: 1
AMBULATION ASSISTANCE: INDEPENDENT
AMBULATION ASSISTANCE ON FLAT SURFACES: 1
CURRENT_FUNCTION: INDEPENDENT

## 2024-11-29 ENCOUNTER — HOME CARE VISIT (OUTPATIENT)
Dept: HOME HEALTH SERVICES | Facility: HOME HEALTH | Age: 59
End: 2024-11-29
Payer: COMMERCIAL

## 2024-12-03 ENCOUNTER — HOME CARE VISIT (OUTPATIENT)
Dept: HOME HEALTH SERVICES | Facility: HOME HEALTH | Age: 59
End: 2024-12-03
Payer: COMMERCIAL

## 2024-12-03 VITALS — TEMPERATURE: 98.5 F | HEART RATE: 72 BPM | DIASTOLIC BLOOD PRESSURE: 98 MMHG | SYSTOLIC BLOOD PRESSURE: 164 MMHG

## 2024-12-03 PROCEDURE — G0151 HHCP-SERV OF PT,EA 15 MIN: HCPCS

## 2024-12-03 SDOH — HEALTH STABILITY: PHYSICAL HEALTH: EXERCISE TYPE: LE THER EX

## 2024-12-03 ASSESSMENT — ENCOUNTER SYMPTOMS
LIMITED RANGE OF MOTION: 1
PAIN LOCATION - PAIN SEVERITY: 3/10
PAIN: 1
PAIN LOCATION: RIGHT KNEE
MUSCLE WEAKNESS: 1
PERSON REPORTING PAIN: PATIENT

## 2024-12-03 ASSESSMENT — ACTIVITIES OF DAILY LIVING (ADL)
AMBULATION ASSISTANCE: INDEPENDENT
OASIS_M1830: 00
AMBULATION ASSISTANCE: 1
CURRENT_FUNCTION: INDEPENDENT
HOME_HEALTH_OASIS: 00
PHYSICAL TRANSFERS ASSESSED: 1
AMBULATION_DISTANCE/DURATION_TOLERATED: 200 FT
AMBULATION ASSISTANCE ON FLAT SURFACES: 1

## 2024-12-30 DIAGNOSIS — J45.909 ASTHMA, UNSPECIFIED ASTHMA SEVERITY, UNSPECIFIED WHETHER COMPLICATED, UNSPECIFIED WHETHER PERSISTENT (HHS-HCC): ICD-10-CM

## 2024-12-30 DIAGNOSIS — D3A.00 BENIGN CARCINOID TUMOR OF UNSPECIFIED SITE: ICD-10-CM

## 2024-12-30 RX ORDER — ALBUTEROL SULFATE 90 UG/1
2 INHALANT RESPIRATORY (INHALATION) EVERY 4 HOURS PRN
Qty: 8 G | Refills: 2 | Status: SHIPPED | OUTPATIENT
Start: 2024-12-30

## 2024-12-30 RX ORDER — CHOLESTYRAMINE 4 G/4.8G
4 POWDER, FOR SUSPENSION ORAL 2 TIMES DAILY
Qty: 60 PACKET | Refills: 4 | Status: SHIPPED | OUTPATIENT
Start: 2024-12-30

## 2024-12-30 NOTE — TELEPHONE ENCOUNTER
Refill - [facility] -  Cholestyramine light 4 gram packet  Albuterol inhaler     Lv 11/1/24 Gilda, 1/2/25 Isai

## 2025-01-02 ENCOUNTER — OFFICE VISIT (OUTPATIENT)
Dept: PRIMARY CARE | Facility: CLINIC | Age: 60
End: 2025-01-02
Payer: COMMERCIAL

## 2025-01-02 VITALS
TEMPERATURE: 97.3 F | HEIGHT: 62 IN | SYSTOLIC BLOOD PRESSURE: 162 MMHG | OXYGEN SATURATION: 95 % | WEIGHT: 194 LBS | HEART RATE: 69 BPM | DIASTOLIC BLOOD PRESSURE: 98 MMHG | BODY MASS INDEX: 35.7 KG/M2

## 2025-01-02 DIAGNOSIS — E78.2 MIXED HYPERLIPIDEMIA: ICD-10-CM

## 2025-01-02 DIAGNOSIS — Z76.89 ENCOUNTER TO ESTABLISH CARE WITH NEW DOCTOR: Primary | ICD-10-CM

## 2025-01-02 DIAGNOSIS — I10 PRIMARY HYPERTENSION: ICD-10-CM

## 2025-01-02 DIAGNOSIS — E55.9 VITAMIN D DEFICIENCY: ICD-10-CM

## 2025-01-02 DIAGNOSIS — D62 ACUTE BLOOD LOSS AS CAUSE OF POSTOPERATIVE ANEMIA: ICD-10-CM

## 2025-01-02 DIAGNOSIS — R73.9 HYPERGLYCEMIA: ICD-10-CM

## 2025-01-02 PROCEDURE — G2211 COMPLEX E/M VISIT ADD ON: HCPCS | Performed by: INTERNAL MEDICINE

## 2025-01-02 PROCEDURE — 3077F SYST BP >= 140 MM HG: CPT | Performed by: INTERNAL MEDICINE

## 2025-01-02 PROCEDURE — 3080F DIAST BP >= 90 MM HG: CPT | Performed by: INTERNAL MEDICINE

## 2025-01-02 PROCEDURE — 99214 OFFICE O/P EST MOD 30 MIN: CPT | Performed by: INTERNAL MEDICINE

## 2025-01-02 PROCEDURE — 3008F BODY MASS INDEX DOCD: CPT | Performed by: INTERNAL MEDICINE

## 2025-01-02 ASSESSMENT — PATIENT HEALTH QUESTIONNAIRE - PHQ9
2. FEELING DOWN, DEPRESSED OR HOPELESS: NOT AT ALL
1. LITTLE INTEREST OR PLEASURE IN DOING THINGS: NOT AT ALL
SUM OF ALL RESPONSES TO PHQ9 QUESTIONS 1 AND 2: 0

## 2025-01-02 ASSESSMENT — PAIN SCALES - GENERAL: PAINLEVEL_OUTOF10: 0-NO PAIN

## 2025-01-02 NOTE — PROGRESS NOTES
[facility]: [facility]  PROGRESS NOTE      Stacey Dyer is a 59 y.o. female that is presenting today for New Patient Visit ([facility] transfer).    Assessment/Plan   Diagnoses and all orders for this visit:  Encounter to establish care with new doctor      - Reviewed patient's available records, discussed PMH, Current active problems Meds and allergies.  Primary hypertension      Mixed hyperlipidemia  -     Comprehensive Metabolic Panel; Future  -     Lipid Panel; Future  -     TSH with reflex to Free T4 if abnormal; Future  Hyperglycemia  -     Comprehensive Metabolic Panel; Future  -     Hemoglobin A1C; Future  -     TSH with reflex to Free T4 if abnormal; Future  Vitamin D deficiency  -     Vitamin D 25-Hydroxy,Total (for eval of Vitamin D levels); Future  Acute blood loss as cause of postoperative anemia  -     CBC and Auto Differential; Future  Other orders  -     Follow Up In Primary Care; Future  Subjective   HPI    - Stacey Dyer 59 y.o. female is here today to          - Patient denies any *** symptoms or concerns at this time.       - patient denies any adverse reactions to or concerns with his/her meds.       - Problem list and medication reconciliation done today.  - V.S. Stable. No changes at this time.  - Encouraged continued diet and exercise modification.     Review of Systems   All pertinent POSITIVES as noted per HPI.  All other systems have been reviewed and are NEGATIVE and /or Noncontributory to this patient current visit or complaint.     Objective   Vitals:    01/02/25 1415   BP: (!) 162/98   Pulse: 69   Temp: 36.3 °C (97.3 °F)   SpO2: 95%      Body mass index is 35.48 kg/m².  Physical Exam  Diagnostic Results   Lab Results   Component Value Date    GLUCOSE 143 (H) 11/12/2024    CALCIUM 8.6 11/12/2024     (L) 11/12/2024    K 4.0 11/12/2024    CO2 22 11/12/2024     11/12/2024    BUN 15 11/12/2024    CREATININE 0.81 11/12/2024     Lab Results   Component  "Value Date     (H) 03/26/2024    AST 43 (H) 03/26/2024    ALKPHOS 78 03/26/2024    BILITOT 0.5 03/26/2024     Lab Results   Component Value Date    WBC 12.3 (H) 11/12/2024    HGB 12.6 11/12/2024    HCT 37.6 11/12/2024     11/12/2024     11/12/2024     Lab Results   Component Value Date    CHOL 224 (H) 07/21/2023    CHOL 237 (H) 03/05/2018     Lab Results   Component Value Date     07/21/2023    HDL 75 03/05/2018     Lab Results   Component Value Date    LDLCALC 51 (L) 07/21/2023    LDLCALC 148 (H) 03/05/2018     Lab Results   Component Value Date    TRIG 84 07/21/2023    TRIG 70 03/05/2018     No components found for: \"CHOLHDL\"  Lab Results   Component Value Date    HGBA1C 5.0 06/15/2020     Other labs not included in the list above were reviewed either before or during this encounter.    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   [facility]    DUB (dysfunctional uterine bleeding) 04/26/2011    Fatty liver 09/12/2023    Hep C/B, iron,ferritin,antismooth muscle abs neg. 2/21 ( liver OK on previous CT scn)    Primary osteoarthritis involving multiple joints 01/12/2024    B knee arthritis 7/23 , Frames rec.    Pure hypercholesterolemia 09/12/2023    Right thyroid nodule 08/19/2018    Status post partial colectomy 04/11/2018    Temporomandibular joint syndrome 09/12/2023    Thyroid nodule      8/18 Bx neg. 7/23 [facility] released,TSH nl     Past Surgical History:   Procedure Laterality Date    APPENDECTOMY      CHOLECYSTECTOMY      COLECTOMY  2018    partial    KNEE ARTHROPLASTY Left      Family History   Problem Relation Name Age of Onset    No Known Problems Mother      No Known Problems Father       Social History     Socioeconomic History    Marital status:      Spouse name: Not on file    Number of children: Not on file    Years of education: Not on file "    Highest education level: Not on file   Occupational History    Not on file   Tobacco Use    Smoking status: Former     Current packs/day: 0.00     Average packs/day: 0.5 packs/day for 10.0 years (5.0 ttl pk-yrs)     Types: Cigarettes     Start date:      Quit date: 2000     Years since quittin.0    Smokeless tobacco: Never   Vaping Use    Vaping status: Never Used   Substance and Sexual Activity    Alcohol use: Not Currently     Comment: OCCASIONAL    Drug use: Never    Sexual activity: Defer   Other Topics Concern    Not on file   Social History Narrative    Not on file     Social Drivers of Health     Financial Resource Strain: Low Risk  (2024)    Overall Financial Resource Strain (CARDIA)     Difficulty of Paying Living Expenses: Not very hard   Food Insecurity: No Food Insecurity (2024)    Hunger Vital Sign     Worried About Running Out of Food in the Last Year: Never true     Ran Out of Food in the Last Year: Never true   Transportation Needs: No Transportation Needs (12/3/2024)    OASIS : Transportation     Lack of Transportation (Medical): No     Lack of Transportation (Non-Medical): No     Patient Unable or Declines to Respond: No   Physical Activity: Sufficiently Active (2024)    Exercise Vital Sign     Days of Exercise per Week: 5 days     Minutes of Exercise per Session: 30 min   Stress: No Stress Concern Present (2024)    [employer] [place] [employer] - Occupational Stress Questionnaire     Feeling of Stress : Only a little   Social Connections: Feeling Socially Integrated (12/3/2024)    OASIS : Social Isolation     Frequency of experiencing loneliness or isolation: Never   Intimate Partner Violence: Not At Risk (2024)    Humiliation, Afraid, Rape, and Kick questionnaire     Fear of Current or Ex-Partner: No     Emotionally Abused: No     Physically Abused: No     Sexually Abused: No   Housing Stability: Low Risk  (2024)    Housing  Stability Vital Sign     Unable to Pay for Housing in the Last Year: No     Number of Times Moved in the Last Year: 0     Homeless in the Last Year: No     Allergies   Allergen Reactions    Bee Venom Protein (Honey Bee) Swelling     FACIALSWELLING THROAT SWELLING    Azithromycin Nausea/vomiting     Current Outpatient Medications on File Prior to Visit   Medication Sig Dispense Refill    acetaminophen (Tylenol) 325 mg tablet Take 1 tablet (325 mg) by mouth every 6 hours if needed for mild pain (1 - 3).      Advair Diskus 250-50 mcg/dose diskus inhaler INHALE 1 PUFF BY MOUTH TWICE A DAY FOR 90 DAYS *RINSE MOUTH AFTER USE* 180 each 2    albuterol 90 mcg/actuation inhaler Inhale 2 puffs every 4 hours if needed for wheezing. 8 g 2    cholestyramine light (Prevalite) 4 gram packet Take 1 packet (4 g) by mouth 2 times a day. USE 1 PACKET 1 TO 2 TIMES A DAY ORALLY AS DIRECTED FOR 90 DAYS TOTAL 30 60 packet 4    EpiPen 2-Thomas 0.3 mg/0.3 mL injection syringe as directed Injection for anaphylactic shock for 30 days      ibuprofen 200 mg tablet Take 2 tablets (400 mg) by mouth every 6 hours if needed for mild pain (1 - 3).      oxyCODONE (Roxicodone) 5 mg immediate release tablet Take 1 tablet (5 mg) by mouth once daily.      [DISCONTINUED] albuterol 90 mcg/actuation inhaler INHALE 2 PUFFS BY MOUTH EVERY 4 HOURS AS NEEDED FOR 90 DAYS 8 g 2    [DISCONTINUED] cholestyramine light (Prevalite) 4 gram packet USE 1 PACKET 1 TO 2 TIMES A DAY ORALLY AS DIRECTED FOR 90 DAYS TOTAL 30 60 packet 4     No current facility-administered medications on file prior to visit.     Immunization History   Administered Date(s) Administered    Influenza, Unspecified 02/12/2014    Influenza, injectable, quadrivalent 10/19/2016    Influenza, seasonal, injectable 11/01/2004, 01/01/2008, 09/21/2011    Novel Influenza-H1N1-09, all formulations 12/01/2009    Novel influenza-H1N1-09, preservative-free 12/17/2009    Pneumococcal polysaccharide vaccine,  23-valent, age 2 years and older (PNEUMOVAX 23) 01/01/2008, 12/01/2021    Tdap vaccine, age 7 year and older (BOOSTRIX, ADACEL) 09/21/2011, 12/01/2021     Patient's medical history was reviewed and updated either before or during this encounter.       Chai Alex MD

## 2025-01-02 NOTE — PATIENT INSTRUCTIONS
New Dx of HTN   - Your blood pressure was Elevated in the office today   - Elevated BP can increase your risk for Strokes, Heart Disease, Kidney disease and affect your vision that may cause Blindness..    - Will not start medication at this visit, you'll start monitoring your blood pressures at home, and notify the office if you get persistent numbers frequently above 150/90.    BP Management:              Lifestyle Rcommendations:  Hypertension education provided to you today             Weight Reduction Recommendations:  Weight-reducing diet education provided to you today             Dietary Recommendations:  Dietary management guidance, counseling and education (Dash Diet) provided to you today              Physical Activity Recommendations:  Giving encouragement to exercise 30 minutes a day 5 days a week             Moderation of ETOH Consumption Recommendations:  Counseling about alcohol consumption             Hypertensive Follow-up Plan: 6-8 weeks follow-up planned and scheduled

## 2025-02-19 DIAGNOSIS — J45.909 ASTHMA, UNSPECIFIED ASTHMA SEVERITY, UNSPECIFIED WHETHER COMPLICATED, UNSPECIFIED WHETHER PERSISTENT (HHS-HCC): ICD-10-CM

## 2025-02-19 RX ORDER — FLUTICASONE PROPIONATE AND SALMETEROL 250; 50 UG/1; UG/1
1 POWDER RESPIRATORY (INHALATION) 2 TIMES DAILY
Qty: 180 EACH | Refills: 2 | Status: SHIPPED | OUTPATIENT
Start: 2025-02-19

## 2025-02-27 ENCOUNTER — OFFICE VISIT (OUTPATIENT)
Dept: PRIMARY CARE | Facility: CLINIC | Age: 60
End: 2025-02-27
Payer: COMMERCIAL

## 2025-02-27 VITALS
OXYGEN SATURATION: 99 % | WEIGHT: 194 LBS | HEART RATE: 64 BPM | HEIGHT: 62 IN | TEMPERATURE: 97.1 F | BODY MASS INDEX: 35.7 KG/M2 | DIASTOLIC BLOOD PRESSURE: 95 MMHG | SYSTOLIC BLOOD PRESSURE: 162 MMHG

## 2025-02-27 DIAGNOSIS — K76.0 NAFL (NONALCOHOLIC FATTY LIVER): ICD-10-CM

## 2025-02-27 DIAGNOSIS — E55.9 VITAMIN D DEFICIENCY: ICD-10-CM

## 2025-02-27 DIAGNOSIS — I10 PRIMARY HYPERTENSION: Primary | ICD-10-CM

## 2025-02-27 LAB
25(OH)D3+25(OH)D2 SERPL-MCNC: 21 NG/ML (ref 30–100)
ALBUMIN SERPL-MCNC: 4.1 G/DL (ref 3.6–5.1)
ALP SERPL-CCNC: 81 U/L (ref 37–153)
ALT SERPL-CCNC: 54 U/L (ref 6–29)
ANION GAP SERPL CALCULATED.4IONS-SCNC: 8 MMOL/L (CALC) (ref 7–17)
AST SERPL-CCNC: 36 U/L (ref 10–35)
BASOPHILS # BLD AUTO: 21 CELLS/UL (ref 0–200)
BASOPHILS NFR BLD AUTO: 0.4 %
BILIRUB SERPL-MCNC: 0.6 MG/DL (ref 0.2–1.2)
BUN SERPL-MCNC: 11 MG/DL (ref 7–25)
CALCIUM SERPL-MCNC: 9.5 MG/DL (ref 8.6–10.4)
CHLORIDE SERPL-SCNC: 104 MMOL/L (ref 98–110)
CHOLEST SERPL-MCNC: 188 MG/DL
CHOLEST/HDLC SERPL: 1.8 (CALC)
CO2 SERPL-SCNC: 27 MMOL/L (ref 20–32)
CREAT SERPL-MCNC: 0.72 MG/DL (ref 0.5–1.05)
EGFRCR SERPLBLD CKD-EPI 2021: 95 ML/MIN/1.73M2
EOSINOPHIL # BLD AUTO: 161 CELLS/UL (ref 15–500)
EOSINOPHIL NFR BLD AUTO: 3.1 %
ERYTHROCYTE [DISTWIDTH] IN BLOOD BY AUTOMATED COUNT: 12.7 % (ref 11–15)
EST. AVERAGE GLUCOSE BLD GHB EST-MCNC: 111 MG/DL
EST. AVERAGE GLUCOSE BLD GHB EST-SCNC: 6.2 MMOL/L
GLUCOSE SERPL-MCNC: 94 MG/DL (ref 65–99)
HBA1C MFR BLD: 5.5 % OF TOTAL HGB
HCT VFR BLD AUTO: 40.1 % (ref 35–45)
HDLC SERPL-MCNC: 103 MG/DL
HGB BLD-MCNC: 13.6 G/DL (ref 11.7–15.5)
LDLC SERPL CALC-MCNC: 71 MG/DL (CALC)
LYMPHOCYTES # BLD AUTO: 1362 CELLS/UL (ref 850–3900)
LYMPHOCYTES NFR BLD AUTO: 26.2 %
MCH RBC QN AUTO: 33.3 PG (ref 27–33)
MCHC RBC AUTO-ENTMCNC: 33.9 G/DL (ref 32–36)
MCV RBC AUTO: 98.3 FL (ref 80–100)
MONOCYTES # BLD AUTO: 510 CELLS/UL (ref 200–950)
MONOCYTES NFR BLD AUTO: 9.8 %
NEUTROPHILS # BLD AUTO: 3146 CELLS/UL (ref 1500–7800)
NEUTROPHILS NFR BLD AUTO: 60.5 %
NONHDLC SERPL-MCNC: 85 MG/DL (CALC)
PLATELET # BLD AUTO: 313 THOUSAND/UL (ref 140–400)
PMV BLD REES-ECKER: 9.5 FL (ref 7.5–12.5)
POTASSIUM SERPL-SCNC: 4.2 MMOL/L (ref 3.5–5.3)
PROT SERPL-MCNC: 7.7 G/DL (ref 6.1–8.1)
RBC # BLD AUTO: 4.08 MILLION/UL (ref 3.8–5.1)
SODIUM SERPL-SCNC: 139 MMOL/L (ref 135–146)
TRIGL SERPL-MCNC: 63 MG/DL
TSH SERPL-ACNC: 0.74 MIU/L (ref 0.4–4.5)
WBC # BLD AUTO: 5.2 THOUSAND/UL (ref 3.8–10.8)

## 2025-02-27 PROCEDURE — 3008F BODY MASS INDEX DOCD: CPT | Performed by: INTERNAL MEDICINE

## 2025-02-27 PROCEDURE — 3077F SYST BP >= 140 MM HG: CPT | Performed by: INTERNAL MEDICINE

## 2025-02-27 PROCEDURE — 3080F DIAST BP >= 90 MM HG: CPT | Performed by: INTERNAL MEDICINE

## 2025-02-27 PROCEDURE — 99214 OFFICE O/P EST MOD 30 MIN: CPT | Performed by: INTERNAL MEDICINE

## 2025-02-27 RX ORDER — LOSARTAN POTASSIUM 50 MG/1
50 TABLET ORAL DAILY
Qty: 30 TABLET | Refills: 1 | Status: SHIPPED | OUTPATIENT
Start: 2025-02-27

## 2025-02-27 RX ORDER — SAME BUTANEDISULFONATE/BETAINE 400-600 MG
POWDER IN PACKET (EA) ORAL
Start: 2025-03-27 | End: 2025-06-27

## 2025-02-27 ASSESSMENT — PAIN SCALES - GENERAL: PAINLEVEL_OUTOF10: 1

## 2025-02-27 NOTE — PROGRESS NOTES
[facility]: [facility]  PROGRESS NOTE      Stacey Dyer is a 59 y.o. female that is presenting today for Follow-up (8 weeks htn fu).    Assessment/Plan   Diagnoses and all orders for this visit:  Primary hypertension  -  Start   losartan (Cozaar) 50 mg tablet; Take 1 tablet (50 mg) by mouth once daily.  -     Renal function panel; Future  Vitamin D deficiency  -  Start   cholecalciferol, vitD3,/vit K2 (vitamin D3-vitamin K2) 250 mcg (10,000 unit)-45 mcg capsule; Take 1 capsule once a day Weekdays and hold on Weekends x 90 days Do not fill before March 27, 2025.  NAFL (nonalcoholic fatty liver)  Other orders  -     Follow Up In Primary Care  -     Follow Up In Primary Care; Future  Subjective   HPI    - Stacey Dyer 59 y.o. female is here today for FUV on her elevated BP, been        - Patient denies any other symptoms or concerns at this time.       - patient denies any adverse reactions to or concerns with his/her meds.       - Problem list and medication reconciliation done today.  - V.S. Stable. No changes at this time.  - Encouraged continued diet and exercise modification.     Review of Systems   All pertinent POSITIVES as noted per HPI.  All other systems have been reviewed and are NEGATIVE and /or Noncontributory to this patient current visit or complaint.     Objective   Vitals:    02/27/25 1510   BP: (!) 162/95   Pulse: 64   Temp: 36.2 °C (97.1 °F)   SpO2: 99%      Body mass index is 35.48 kg/m².  Physical Exam  Vitals and nursing note reviewed.   Constitutional:       Appearance: Normal appearance.   HENT:      Head: Normocephalic and atraumatic.   Neck:      Vascular: No carotid bruit.   Cardiovascular:      Rate and Rhythm: Normal rate and regular rhythm.      Pulses: Normal pulses.      Heart sounds: Normal heart sounds.   Pulmonary:      Effort: Pulmonary effort is normal.      Breath sounds: Normal breath sounds.   Abdominal:      General: Abdomen is flat. Bowel sounds  "are normal.      Palpations: Abdomen is soft.      Tenderness: There is no abdominal tenderness.   Musculoskeletal:         General: No swelling. Normal range of motion.      Cervical back: Neck supple.   Lymphadenopathy:      Cervical: No cervical adenopathy.   Skin:     General: Skin is warm and dry.   Neurological:      Mental Status: She is alert.   Psychiatric:         Mood and Affect: Mood normal.       Diagnostic Results   Lab Results   Component Value Date    GLUCOSE 94 02/26/2025    CALCIUM 9.5 02/26/2025     02/26/2025    K 4.2 02/26/2025    CO2 27 02/26/2025     02/26/2025    BUN 11 02/26/2025    CREATININE 0.72 02/26/2025     Lab Results   Component Value Date    ALT 54 (H) 02/26/2025    AST 36 (H) 02/26/2025    ALKPHOS 81 02/26/2025    BILITOT 0.6 02/26/2025     Lab Results   Component Value Date    WBC 5.2 02/26/2025    HGB 13.6 02/26/2025    HCT 40.1 02/26/2025    MCV 98.3 02/26/2025     02/26/2025     Lab Results   Component Value Date    CHOL 188 02/26/2025    CHOL 224 (H) 07/21/2023    CHOL 237 (H) 03/05/2018     Lab Results   Component Value Date     02/26/2025     07/21/2023    HDL 75 03/05/2018     Lab Results   Component Value Date    LDLCALC 71 02/26/2025    LDLCALC 51 (L) 07/21/2023    LDLCALC 148 (H) 03/05/2018     Lab Results   Component Value Date    TRIG 63 02/26/2025    TRIG 84 07/21/2023    TRIG 70 03/05/2018     No components found for: \"CHOLHDL\"  Lab Results   Component Value Date    HGBA1C 5.5 02/26/2025     Other labs not included in the list above were reviewed either before or during this encounter.    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   [facility]    DUB (dysfunctional uterine bleeding) 04/26/2011    Fatty liver 09/12/2023    Hep C/B, iron,ferritin,antismooth muscle abs neg. 2/21 ( liver OK on previous CT scn)    Primary " osteoarthritis involving multiple joints 2024    B knee arthritis  , Frames rec.    Pure hypercholesterolemia 2023    Right thyroid nodule 2018    Status post partial colectomy 2018    Temporomandibular joint syndrome 2023    Thyroid nodule       Bx neg.  [facility] released,TSH nl     Past Surgical History:   Procedure Laterality Date    APPENDECTOMY      CHOLECYSTECTOMY      COLECTOMY  2018    partial    KNEE ARTHROPLASTY Left      Family History   Problem Relation Name Age of Onset    No Known Problems Mother      No Known Problems Father       Social History     Socioeconomic History    Marital status:      Spouse name: Not on file    Number of children: Not on file    Years of education: Not on file    Highest education level: Not on file   Occupational History    Not on file   Tobacco Use    Smoking status: Former     Current packs/day: 0.00     Average packs/day: 0.5 packs/day for 10.0 years (5.0 ttl pk-yrs)     Types: Cigarettes     Start date:      Quit date: 2000     Years since quittin.1     Passive exposure: Past    Smokeless tobacco: Never   Vaping Use    Vaping status: Never Used   Substance and Sexual Activity    Alcohol use: Not Currently     Comment: OCCASIONAL    Drug use: Never    Sexual activity: Defer   Other Topics Concern    Not on file   Social History Narrative    Not on file     Social Drivers of Health     Financial Resource Strain: Low Risk  (2024)    Overall Financial Resource Strain (CARDIA)     Difficulty of Paying Living Expenses: Not very hard   Food Insecurity: No Food Insecurity (2024)    Hunger Vital Sign     Worried About Running Out of Food in the Last Year: Never true     Ran Out of Food in the Last Year: Never true   Transportation Needs: No Transportation Needs (12/3/2024)    OASIS : Transportation     Lack of Transportation (Medical): No     Lack of Transportation (Non-Medical): No     Patient  Unable or Declines to Respond: No   Physical Activity: Sufficiently Active (11/12/2024)    Exercise Vital Sign     Days of Exercise per Week: 5 days     Minutes of Exercise per Session: 30 min   Stress: No Stress Concern Present (11/12/2024)    [employer] - Occupational Stress Questionnaire     Feeling of Stress : Only a little   Social Connections: Feeling Socially Integrated (12/3/2024)    OASIS : Social Isolation     Frequency of experiencing loneliness or isolation: Never   Intimate Partner Violence: Not At Risk (11/12/2024)    Humiliation, Afraid, Rape, and Kick questionnaire     Fear of Current or Ex-Partner: No     Emotionally Abused: No     Physically Abused: No     Sexually Abused: No   Housing Stability: Low Risk  (11/12/2024)    Housing Stability Vital Sign     Unable to Pay for Housing in the Last Year: No     Number of Times Moved in the Last Year: 0     Homeless in the Last Year: No     Allergies   Allergen Reactions    Bee Venom Protein (Honey Bee) Swelling     FACIALSWELLING THROAT SWELLING    Azithromycin Nausea/vomiting     Current Outpatient Medications on File Prior to Visit   Medication Sig Dispense Refill    acetaminophen (Tylenol) 325 mg tablet Take 1 tablet (325 mg) by mouth every 6 hours if needed for mild pain (1 - 3).      albuterol 90 mcg/actuation inhaler Inhale 2 puffs every 4 hours if needed for wheezing. 8 g 2    cholestyramine light (Prevalite) 4 gram packet Take 1 packet (4 g) by mouth 2 times a day. USE 1 PACKET 1 TO 2 TIMES A DAY ORALLY AS DIRECTED FOR 90 DAYS TOTAL 30 60 packet 4    EpiPen 2-Thomas 0.3 mg/0.3 mL injection syringe as directed Injection for anaphylactic shock for 30 days      fluticasone propion-salmeteroL (Advair Diskus) 250-50 mcg/dose diskus inhaler Inhale 1 puff 2 times a day. Rinse mouth with water after use to reduce aftertaste and incidence of candidiasis. Do not swallow. 180 each 2    ibuprofen 200 mg tablet Take 2 tablets  (400 mg) by mouth every 6 hours if needed for mild pain (1 - 3).      [DISCONTINUED] oxyCODONE (Roxicodone) 5 mg immediate release tablet Take 1 tablet (5 mg) by mouth once daily.       No current facility-administered medications on file prior to visit.     Immunization History   Administered Date(s) Administered    Influenza, Unspecified 02/12/2014    Influenza, injectable, quadrivalent 10/19/2016    Influenza, seasonal, injectable 11/01/2004, 01/01/2008, 09/21/2011    Novel Influenza-H1N1-09, all formulations 12/01/2009    Novel influenza-H1N1-09, preservative-free 12/17/2009    Pneumococcal polysaccharide vaccine, 23-valent, age 2 years and older (PNEUMOVAX 23) 01/01/2008, 12/01/2021    Tdap vaccine, age 7 year and older (BOOSTRIX, ADACEL) 09/21/2011, 12/01/2021     Patient's medical history was reviewed and updated either before or during this encounter.       Chai Alex MD

## 2025-03-22 DIAGNOSIS — I10 PRIMARY HYPERTENSION: ICD-10-CM

## 2025-03-24 RX ORDER — LOSARTAN POTASSIUM 50 MG/1
50 TABLET ORAL DAILY
Qty: 90 TABLET | Refills: 0 | Status: SHIPPED | OUTPATIENT
Start: 2025-03-24

## 2025-04-09 DIAGNOSIS — J45.909 ASTHMA, UNSPECIFIED ASTHMA SEVERITY, UNSPECIFIED WHETHER COMPLICATED, UNSPECIFIED WHETHER PERSISTENT (HHS-HCC): ICD-10-CM

## 2025-04-09 NOTE — TELEPHONE ENCOUNTER
NV-05/01/2025   LV-2/27/2025  Dispense: 8 g   Refills: 2 ordered   Pharmacy: [facility]/pharmacy #[id number] [facility] [address] AT [facility] (Ph: [phone])   Order Details  Ordered on: 12/30/24

## 2025-04-10 RX ORDER — ALBUTEROL SULFATE 90 UG/1
2 INHALANT RESPIRATORY (INHALATION) EVERY 4 HOURS PRN
Qty: 8 G | Refills: 1 | Status: SHIPPED | OUTPATIENT
Start: 2025-04-10

## 2025-05-01 ENCOUNTER — APPOINTMENT (OUTPATIENT)
Dept: PRIMARY CARE | Facility: CLINIC | Age: 60
End: 2025-05-01
Payer: COMMERCIAL

## 2025-06-17 ENCOUNTER — TELEPHONE (OUTPATIENT)
Dept: PRIMARY CARE | Facility: CLINIC | Age: 60
End: 2025-06-17
Payer: COMMERCIAL

## 2025-06-17 DIAGNOSIS — I10 PRIMARY HYPERTENSION: ICD-10-CM

## 2025-06-18 RX ORDER — LOSARTAN POTASSIUM 50 MG/1
50 TABLET ORAL DAILY
Qty: 90 TABLET | Refills: 0 | Status: SHIPPED | OUTPATIENT
Start: 2025-06-18

## 2025-06-27 LAB
ALBUMIN SERPL-MCNC: 4 G/DL (ref 3.6–5.1)
BUN SERPL-MCNC: 16 MG/DL (ref 7–25)
BUN/CREAT SERPL: NORMAL (CALC) (ref 6–22)
CALCIUM SERPL-MCNC: 9.1 MG/DL (ref 8.6–10.4)
CHLORIDE SERPL-SCNC: 104 MMOL/L (ref 98–110)
CO2 SERPL-SCNC: 25 MMOL/L (ref 20–32)
CREAT SERPL-MCNC: 0.79 MG/DL (ref 0.5–1.05)
EGFRCR SERPLBLD CKD-EPI 2021: 85 ML/MIN/1.73M2
GLUCOSE SERPL-MCNC: 84 MG/DL (ref 65–139)
PHOSPHATE SERPL-MCNC: 3.7 MG/DL (ref 2.5–4.5)
POTASSIUM SERPL-SCNC: 4.2 MMOL/L (ref 3.5–5.3)
SODIUM SERPL-SCNC: 136 MMOL/L (ref 135–146)

## 2025-07-01 ENCOUNTER — TELEPHONE (OUTPATIENT)
Dept: PRIMARY CARE | Facility: CLINIC | Age: 60
End: 2025-07-01
Payer: COMMERCIAL

## 2025-07-01 DIAGNOSIS — E55.9 VITAMIN D DEFICIENCY: ICD-10-CM

## 2025-07-01 DIAGNOSIS — Z78.0 MENOPAUSE: ICD-10-CM

## 2025-07-01 DIAGNOSIS — Z12.31 ENCOUNTER FOR SCREENING MAMMOGRAM FOR BREAST CANCER: Primary | ICD-10-CM

## 2025-07-01 DIAGNOSIS — Z13.820 ENCOUNTER FOR SCREENING FOR OSTEOPOROSIS: ICD-10-CM

## 2025-07-01 NOTE — TELEPHONE ENCOUNTER
PLEASE VERIFY IF YOU WANT A cmp  AGAIN IN 2 MONTHS DIDN'T SEE AND ORDER, VIT D WAS 21 DO YOU WANT HER ON DAILY DOSING OR MWF

## 2025-09-08 ENCOUNTER — APPOINTMENT (OUTPATIENT)
Dept: RADIOLOGY | Facility: CLINIC | Age: 60
End: 2025-09-08
Payer: COMMERCIAL

## (undated) DEVICE — SUTURE, VICRYL, 2-0, 36 IN, CT-1, UNDYED

## (undated) DEVICE — Device

## (undated) DEVICE — DRESSING, MEPILEX BORDER, POST-OP AG, 4 X 10 IN

## (undated) DEVICE — CUFF, TOURNIQUET, 30 X 4, DUAL PORT/SNGL BLADDER, DISP, LF

## (undated) DEVICE — HANDPIECE, INTERPULSE, W/RETRACTABLE COAX FAN, STERILE

## (undated) DEVICE — SUTURE, VICRYL, 0, 36 IN, CT-1, UNDYED

## (undated) DEVICE — FACE SHIELD, OPTI-COM

## (undated) DEVICE — GLOVE, PROTEXIS PI CLASSIC, SZ-7.5, PF, LF

## (undated) DEVICE — DRAPE, INSTRUMENT, W/POUCH, STERI DRAPE, 7 X 11 IN, DISPOSABLE, STERILE

## (undated) DEVICE — BLADE, SAW, SAGITTAL, 18.0 X 1.27 X 90MM

## (undated) DEVICE — GLOVE, SURGICAL, PROTEXIS PI BLUE W/NEUTHERA, 8.0, PF, LF

## (undated) DEVICE — TIP, SUCTION, YANKAUER, FLEXIBLE

## (undated) DEVICE — RESERVOIR, WOUND, W/TROCAR, PVC, MEDIUM, 400CC, DAVOL, 1/8 IN, 10FR

## (undated) DEVICE — TIP, SUCTION, FRAZIER, W/CONTROL VENT, 12 FR

## (undated) DEVICE — SUTURE, MONOCRYL, 4-0, 27 IN, PS-2, UNDYED

## (undated) DEVICE — IRRIGATION SYSTEM, WOUND, SURGIPHOR, 450ML, STERILE

## (undated) DEVICE — SOLUTION, POVIDONE IODINE 10%, 0.75 OZ, NS

## (undated) DEVICE — STOCKINETTE, TUBULAR, BIAS CUT, 1 PLY, 4 IN X 50 YD, COTTON, NS

## (undated) DEVICE — CEMENT, MIXEVAC III, 10S BOWL, KNEES

## (undated) DEVICE — CATHETER TRAY, URETHRAL, FOLEY, 16 FR, SILICONE

## (undated) DEVICE — MARKER, SURGICAL, SKIN, REG TIP, W/ RULER & LABELS

## (undated) DEVICE — SOLUTION, IRRIGATION, X RX SODIUM CHL 0.9%, 1000ML BTL

## (undated) DEVICE — UNIVERSAL CEMENT RESTRICTOR - DISPOSABLE INSERTER
Type: IMPLANTABLE DEVICE | Site: TIBIA | Status: NON-FUNCTIONAL
Brand: RESTRICTOR

## (undated) DEVICE — TISSUE ADHESIVE, PREMIERPRO EXOFIN, PRECISION PEN HV, 1.0ML

## (undated) DEVICE — SUTURE, ETHIBOND, P2, V-37, 30 IN, GREEN

## (undated) DEVICE — SLEEVE, VASO PRESS, CALF GARMENT, MEDIUM, GREEN

## (undated) DEVICE — MIXER, CEMENT, MIXEVAC III HIGH VACUUM KIT, STERILE

## (undated) DEVICE — GLOVE, SURGICAL, PROTEXIS PI , 7.5, PF, LF

## (undated) DEVICE — SOLUTION, IRRIGATION, USP, SODIUM CHLORIDE 0.9%, 3000 ML, BAG

## (undated) DEVICE — ADHESIVE, SKIN, DERMABOND ADVANCED, 15CM, PEN-STYLE

## (undated) DEVICE — DRESSING, MEDIPORE W/PAD, 3-1/2X4 IN

## (undated) DEVICE — SUTURE, MONOCRYL, 4-0, 18 IN, PS2, UNDYED